# Patient Record
Sex: FEMALE | Race: WHITE | NOT HISPANIC OR LATINO | Employment: FULL TIME | URBAN - METROPOLITAN AREA
[De-identification: names, ages, dates, MRNs, and addresses within clinical notes are randomized per-mention and may not be internally consistent; named-entity substitution may affect disease eponyms.]

---

## 2017-04-17 ENCOUNTER — OFFICE VISIT (OUTPATIENT)
Dept: URGENT CARE | Facility: MEDICAL CENTER | Age: 32
End: 2017-04-17
Payer: COMMERCIAL

## 2017-04-17 DIAGNOSIS — R23.8 OTHER SKIN CHANGES: ICD-10-CM

## 2017-04-17 PROCEDURE — G0382 LEV 3 HOSP TYPE B ED VISIT: HCPCS

## 2017-04-17 PROCEDURE — S9083 URGENT CARE CENTER GLOBAL: HCPCS

## 2017-04-18 ENCOUNTER — LAB REQUISITION (OUTPATIENT)
Dept: LAB | Facility: HOSPITAL | Age: 32
End: 2017-04-18
Payer: COMMERCIAL

## 2017-04-18 DIAGNOSIS — R23.8 OTHER SKIN CHANGES: ICD-10-CM

## 2017-04-18 PROCEDURE — 87077 CULTURE AEROBIC IDENTIFY: CPT | Performed by: PHYSICIAN ASSISTANT

## 2017-04-18 PROCEDURE — 87147 CULTURE TYPE IMMUNOLOGIC: CPT | Performed by: PHYSICIAN ASSISTANT

## 2017-04-18 PROCEDURE — 87070 CULTURE OTHR SPECIMN AEROBIC: CPT | Performed by: PHYSICIAN ASSISTANT

## 2017-04-18 PROCEDURE — 87205 SMEAR GRAM STAIN: CPT | Performed by: PHYSICIAN ASSISTANT

## 2017-04-18 PROCEDURE — 87186 SC STD MICRODIL/AGAR DIL: CPT | Performed by: PHYSICIAN ASSISTANT

## 2017-04-21 LAB
BACTERIA WND AEROBE CULT: NORMAL
GRAM STN SPEC: NORMAL

## 2017-06-13 ENCOUNTER — OFFICE VISIT (OUTPATIENT)
Dept: URGENT CARE | Facility: MEDICAL CENTER | Age: 32
End: 2017-06-13
Payer: COMMERCIAL

## 2017-06-13 PROCEDURE — 99213 OFFICE O/P EST LOW 20 MIN: CPT

## 2018-01-13 NOTE — PROGRESS NOTES
Assessment   1  Wound of skin (782 9) (R23 8)    Plan   Wound of skin    · (1) WOUND CULTURE; Status:Active; Requested for:[id number];     Discussion/Summary   Discussion Summary:    Take bactrim as directed  Keep lesions clean dry and intact  the wound culture sent  Medication Side Effects Reviewed: Possible side effects of new medications were reviewed with the patient/guardian today  Understands and agrees with treatment plan: The treatment plan was reviewed with the patient/guardian  The patient/guardian understands and agrees with the treatment plan    Counseling Documentation With Imm: The patient was counseled regarding diagnostic results,-- instructions for management,-- risk factor reductions,-- prognosis,-- patient and family education,-- impressions,-- risks and benefits of treatment options,-- importance of compliance with treatment  Follow Up Instructions: Follow Up with your Primary Care Provider in 1-2 days  If your symptoms worsen, go to the nearest [place] [address] Emergency Department  Chief Complaint   Chief Complaint Free Text Note Form: rash on buttocks started one week ago, tried many products, painful, no itching, now having constipation,      History of Present Illness   HPI: This is a 29 y/o F c/o rash in the crack of her butt x 7 days  Pt reports there is a white discharge from the rash  Denies any fever/chills, nausea, vomiting, diarrhea, constipation  Pt reports the rash is very painful and made worse after a BM  Pt denies ever having this type of rash in the past     Hospital Based Practices Required Assessment:      Pain Assessment      the patient states they have pain  The pain is located in the buttocks  (on a scale of 0 to 10, the patient rates the pain at 7 )      Abuse And Domestic Violence Screen       Yes, the patient is safe at home  -- The patient states no one is hurting them  Depression And Suicide Screen   No, the patient has not had thoughts of hurting themself  No, the patient has not felt depressed in the past 7 days  Prefered Language is  english  Primary Language is  english  Readiness To Learn: Receptive  Barriers To Learning: none  Preferred Learning: verbal      Review of Systems   Focused-Female:      Constitutional: No fever, no chills, feels well, no tiredness, no recent weight gain or loss  ENT: no ear ache, no loss of hearing, no nosebleeds or nasal discharge, no sore throat or hoarseness  Cardiovascular: no complaints of slow or fast heart rate, no chest pain, no palpitations, no leg claudication or lower extremity edema  Respiratory: no complaints of shortness of breath, no wheezing, no dyspnea on exertion, no orthopnea or PND  Breasts: no complaints of breast pain, breast lump or nipple discharge  Gastrointestinal: no complaints of abdominal pain, no constipation, no nausea or diarrhea, no vomiting, no bloody stools  Genitourinary: no complaints of dysuria, no incontinence, no pelvic pain, no dysmenorrhea, no vaginal discharge or abnormal vaginal bleeding  Musculoskeletal: no complaints of arthralgia, no myalgia, no joint swelling or stiffness, no limb pain or swelling  Integumentary: no complaints of skin rash or lesion, no itching or dry skin, no skin wounds-- and-- as noted in HPI  Neurological: no complaints of headache, no confusion, no numbness or tingling, no dizziness or fainting  Past Medical History   Active Problems And Past Medical History Reviewed: The active problems and past medical history were reviewed and updated today  Family History   Mother    1  Family history of No known health problems  Father    2  Family history of Lymphoma of lymph nodes  Family History Reviewed: The family history was reviewed and updated today  Social History    · Never a smoker  Social History Reviewed: The social history was reviewed and updated today  Surgical History   1  History of Cholecystectomy  Surgical History Reviewed: The surgical history was reviewed and updated today  Current Meds    1  No Reported Medications Recorded  Medication List Reviewed: The medication list was reviewed and updated today  Allergies   1  Cephalosporins    Vitals   Signs   Recorded: [id number] 06:29PM   Temperature: 99 7 F  Heart Rate: 99  Respiration: 24  Systolic: 112  Diastolic: 82  Weight: 876 lb   Pain Scale: 7    Physical Exam        Constitutional      General appearance: No acute distress, well appearing and well nourished  Skin      Skin and subcutaneous tissue: Normal without rashes or lesions  Examination of the skin for lesions: Abnormal  -- Buttock: erythematous ulcer with white discharge bilaterally        Signatures    Electronically signed by : ALEXUS Levi; Jan 12 2018  7:46AM EST                       (Author)     Electronically signed by : LEIGHA Davis ; Jan 12 2018  4:00PM EST                       (Co-author)

## 2018-03-07 ENCOUNTER — OFFICE VISIT (OUTPATIENT)
Dept: URGENT CARE | Facility: MEDICAL CENTER | Age: 33
End: 2018-03-07
Payer: COMMERCIAL

## 2018-03-07 VITALS
SYSTOLIC BLOOD PRESSURE: 120 MMHG | OXYGEN SATURATION: 98 % | RESPIRATION RATE: 20 BRPM | HEIGHT: 64 IN | WEIGHT: 293 LBS | TEMPERATURE: 98.3 F | BODY MASS INDEX: 50.02 KG/M2 | HEART RATE: 70 BPM | DIASTOLIC BLOOD PRESSURE: 80 MMHG

## 2018-03-07 DIAGNOSIS — M72.2 PLANTAR FASCIITIS: Primary | ICD-10-CM

## 2018-03-07 PROCEDURE — 99213 OFFICE O/P EST LOW 20 MIN: CPT | Performed by: FAMILY MEDICINE

## 2018-03-07 PROCEDURE — S9088 SERVICES PROVIDED IN URGENT: HCPCS | Performed by: FAMILY MEDICINE

## 2018-03-07 NOTE — PROGRESS NOTES
Started with left heel pain x7 days ago  Have been soaking heel in [employer]pson salt baths  Pain with weight bearing and certain flexing of foot  Started kick boxing class for past 2 months

## 2018-03-07 NOTE — PROGRESS NOTES
[facility]        NAME: Ricardo Doran is a 33 y o  female  : 1985    MRN: [id number]  DATE: 2018  TIME: 11:22 AM    Assessment and Plan   Plantar fasciitis [M72 2]  1  Plantar fasciitis           Patient Instructions     Likely plantar fasciitis  Ace wrap applied to foot in office  Rest the foot as much as possible  Avoid walking barefoot  Wear supportive shoes with good arch support  Try over the counter arch support inserts  Ice to the area 10-15 minutes 3-4 times daily  Take ibuprofen twice daily for 2 weeks, take with food  Follow up with podiatry for continued symptoms past 2-3 weeks  Follow up with PCP in 3-5 days  Proceed to  ER if symptoms worsen  Chief Complaint     Chief Complaint   Patient presents with    Foot Pain     x 7 days ago         History of Present Illness       This is a 33year old female presenting for left foot pain x 1 week  She states that she recently began kick boxing, went to a kickboxing class and woke up with pain the next morning  No specific moment of injury that she is aware of  The pain is located on her heel, does not travel anywhere else  The pain is made worse with flexion of the foot and weight bearing  No calf pain, forefoot pain, ankle pain  She has not tried anything for the pain at this time  Review of Systems   Review of Systems   Constitutional: Negative  HENT: Negative  Respiratory: Negative  Cardiovascular: Negative  Musculoskeletal: Positive for gait problem  Negative for joint swelling  Skin: Negative for color change and wound  Current Medications     No current outpatient prescriptions on file      Current Allergies     Allergies as of 2018 - Reviewed 2018   Allergen Reaction Noted    Cefaclor Hives     Cephalosporins  2017    Wheat bran  2015            The following portions of the patient's history were reviewed and updated as appropriate: allergies, current medications, past family history, past medical history, past social history, past surgical history and problem list      No past medical history on file  Past Surgical History:   Procedure Laterality Date    CHOLECYSTECTOMY         No family history on file  Medications have been verified  Objective   /80   Pulse 70   Temp 98 3 °F (36 8 °C) (Temporal)   Resp 20   Ht 5' 4" (1 626 m)   Wt 133 kg (293 lb 4 oz)   SpO2 98%   BMI 50 34 kg/m²        Physical Exam     Physical Exam   Constitutional: She appears well-developed and well-nourished  No distress  Musculoskeletal:   Left foot: There is no ecchymosis, erythema, edema, or overlying skin changes  There is mild TTP when the foot is dorsiflexed and palpated at the heel  No TTP over the ankle or metatarsals  FROM of the foot, sensation intact, cap refill less than 3 seconds  Neurological: She is alert  Skin: Skin is warm and dry  She is not diaphoretic

## 2018-03-07 NOTE — PATIENT INSTRUCTIONS
Likely plantar fasciitis  Ace wrap applied to foot in office  Rest the foot as much as possible  Avoid walking barefoot  Wear supportive shoes with good arch support  Try over the counter arch support inserts  Ice to the area 10-15 minutes 3-4 times daily  Take ibuprofen twice daily for 2 weeks, take with food  Follow up with podiatry for continued symptoms past 2-3 weeks

## 2018-05-20 ENCOUNTER — HOSPITAL ENCOUNTER (EMERGENCY)
Facility: HOSPITAL | Age: 33
Discharge: HOME | End: 2018-05-20
Attending: EMERGENCY MEDICINE | Admitting: EMERGENCY MEDICINE
Payer: COMMERCIAL

## 2018-05-20 ENCOUNTER — APPOINTMENT (EMERGENCY)
Dept: RADIOLOGY | Facility: HOSPITAL | Age: 33
End: 2018-05-20
Payer: COMMERCIAL

## 2018-05-20 VITALS
RESPIRATION RATE: 22 BRPM | HEART RATE: 93 BPM | OXYGEN SATURATION: 95 % | BODY MASS INDEX: 52.08 KG/M2 | WEIGHT: 293 LBS | TEMPERATURE: 97.2 F | SYSTOLIC BLOOD PRESSURE: 180 MMHG | DIASTOLIC BLOOD PRESSURE: 86 MMHG

## 2018-05-20 DIAGNOSIS — S41.152A DOG BITE OF LEFT UPPER EXTREMITY, INITIAL ENCOUNTER: Primary | ICD-10-CM
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
## 2018-05-20 PROCEDURE — 90471 IMMUNIZATION ADMIN: CPT

## 2018-05-20 PROCEDURE — 90715 TDAP VACCINE 7 YRS/> IM: CPT | Performed by: PHYSICIAN ASSISTANT

## 2018-05-20 PROCEDURE — 73090 X-RAY EXAM OF FOREARM: CPT

## 2018-05-20 PROCEDURE — 99283 EMERGENCY DEPT VISIT LOW MDM: CPT

## 2018-05-20 RX ORDER — AMOXICILLIN AND CLAVULANATE POTASSIUM 875; 125 MG/1; MG/1
1 TABLET, FILM COATED ORAL 2 TIMES DAILY
Qty: 20 TABLET | Refills: 0 | Status: SHIPPED | OUTPATIENT
Start: 2018-05-20 | End: 2018-05-22 | Stop reason: ALTCHOICE

## 2018-05-20 RX ORDER — OXYCODONE HYDROCHLORIDE 5 MG/1
5 TABLET ORAL EVERY 6 HOURS PRN
Qty: 16 TABLET | Refills: 0 | Status: SHIPPED | OUTPATIENT
Start: 2018-05-20 | End: 2018-05-22 | Stop reason: ALTCHOICE

## 2018-05-20 RX ORDER — ONDANSETRON 4 MG/1
4 TABLET, ORALLY DISINTEGRATING ORAL ONCE
Status: COMPLETED | OUTPATIENT
Start: 2018-05-20 | End: 2018-05-20

## 2018-05-20 RX ORDER — LIDOCAINE HYDROCHLORIDE AND EPINEPHRINE 10; 10 MG/ML; UG/ML
20 INJECTION, SOLUTION INFILTRATION; PERINEURAL ONCE
Status: COMPLETED | OUTPATIENT
Start: 2018-05-20 | End: 2018-05-20

## 2018-05-20 RX ORDER — OXYCODONE HYDROCHLORIDE 5 MG/1
5 TABLET ORAL ONCE
Status: COMPLETED | OUTPATIENT
Start: 2018-05-20 | End: 2018-05-20

## 2018-05-20 RX ADMIN — LIDOCAINE HYDROCHLORIDE,EPINEPHRINE BITARTRATE 20 ML: 10; .01 INJECTION, SOLUTION INFILTRATION; PERINEURAL at 18:15

## 2018-05-20 RX ADMIN — ONDANSETRON 4 MG: 4 TABLET, ORALLY DISINTEGRATING ORAL at 18:31

## 2018-05-20 RX ADMIN — TETANUS TOXOID, REDUCED DIPHTHERIA TOXOID AND ACELLULAR PERTUSSIS VACCINE, ADSORBED 0.5 ML: 5; 2.5; 8; 8; 2.5 SUSPENSION INTRAMUSCULAR at 18:32

## 2018-05-20 RX ADMIN — OXYCODONE HYDROCHLORIDE 5 MG: 5 TABLET ORAL at 18:31

## 2018-05-20 NOTE — DISCHARGE INSTRUCTIONS
Animal Bite   WHAT YOU NEED TO KNOW:   Animal bite injuries range from shallow cuts to deep, life-threatening wounds  An animal can cut or puncture the skin when it bites  Your skin may be torn from your body  Your skin may swell or bruise even if the bite does not break the skin  Animal bites occur more often on the hands, arms, legs, and face  Bites from dogs and cats are the most common injuries  DISCHARGE INSTRUCTIONS:   Return to the emergency department if:   · You have a fever  · Your wound is red, swollen, and draining pus  · You see red streaks on the skin around the wound  · You can no longer move the bitten area  · Your heartbeat and breathing are much faster than usual     · You feel dizzy and confused  Contact your healthcare provider if:   · Your pain does not get better, even after you take pain medicine  · You have nightmares or flashbacks about the animal bite  · You have questions or concerns about your condition or care  Medicines: You may need any of the following:  · Antibiotics  prevent or treat a bacterial infection  · Prescription pain medicine  may be given  Ask how to take this medicine safely  · A tetanus vaccine  may be needed to prevent tetanus  Tetanus is a life-threatening bacterial infection that affects the nerves and muscles  The bacteria can be spread through animal bites  · A rabies vaccine  may be needed to prevent rabies  Rabies is a life-threatening viral infection  The virus can be spread through animal bites  · Take your medicine as directed  Contact your healthcare provider if you think your medicine is not helping or if you have side effects  Tell him of her if you are allergic to any medicine  Keep a list of the medicines, vitamins, and herbs you take  Include the amounts, and when and why you take them  Bring the list or the pill bottles to follow-up visits  Carry your medicine list with you in case of an emergency    Follow up with your healthcare provider in 1 to 2 days: You may need to return to have your stitches removed  Write down your questions so you remember to ask them during your visits  Self-care:   · Apply antibiotic ointment as directed  This helps prevent infection in minor skin wounds  It is available without a doctor's order  · Keep the wound clean and covered  Wash the wound every day with soap and water or germ-killing cleanser  Ask your healthcare provider about the kinds of bandages to use  · Apply ice on your wound  Ice helps decrease swelling and pain  Ice may also help prevent tissue damage  Use an ice pack, or put crushed ice in a plastic bag  Cover it with a towel and place it on your wound for 15 to 20 minutes every hour or as directed  · Elevate the wound area  Raise your wound above the level of your heart as often as you can  This will help decrease swelling and pain  Prop your wound on pillows or blankets to keep it elevated comfortably  Prevent another animal bite:   · Learn to recognize the signs of a scared or angry pet  Avoid quick, sudden movements  · Do not step between animals that are fighting  · Do not leave a pet alone with a young child  · Do not disturb an animal while it eats, sleeps, or cares for its young  · Do not approach an animal you do not know, especially one that is tied up or caged  · Stay away from animals that seem sick or act strangely  · Do not feed or capture wild animals  © 2017 [employer] Information is for End User's use only and may not be sold, redistributed or otherwise used for commercial purposes  All illustrations and images included in CareNotes® are the copyrighted property of [employer]  or Hugh Cordon  The above information is an  only  It is not intended as medical advice for individual conditions or treatments   Talk to your doctor, nurse or pharmacist before following any medical regimen to see if it is safe and effective for you  Laceration   WHAT YOU NEED TO KNOW:   A laceration is an injury to the skin and the soft tissue underneath it  Lacerations happen when you are cut or hit by something  They can happen anywhere on the body  DISCHARGE INSTRUCTIONS:   Return to the emergency department if:   · You have heavy bleeding or bleeding that does not stop after 10 minutes of holding firm, direct pressure over the wound  · Your wound opens up  Contact your healthcare provider if:   · You have a fever or chills  · Your laceration is red, warm, or swollen  · You have red streaks on your skin coming from your wound  · You have white or yellow drainage from the wound that smells bad  · You have pain that gets worse, even after treatment  · You have questions or concerns about your condition or care  Medicines:   · Prescription pain medicine  may be given  Ask how to take this medicine safely  · Antibiotics  help treat or prevent a bacterial infection  · Take your medicine as directed  Contact your healthcare provider if you think your medicine is not helping or if you have side effects  Tell him or her if you are allergic to any medicine  Keep a list of the medicines, vitamins, and herbs you take  Include the amounts, and when and why you take them  Bring the list or the pill bottles to follow-up visits  Carry your medicine list with you in case of an emergency  Care for your wound as directed:   · Do not get your wound wet  until your healthcare provider says it is okay  Do not soak your wound in water  Do not go swimming until your healthcare provider says it is okay  Carefully wash the wound with soap and water  Gently pat the area dry or allow it to air dry  · Change your bandages  when they get wet, dirty, or after washing  Apply new, clean bandages as directed  Do not apply elastic bandages or tape too tight   Do not put powders or lotions over your incision  · Apply antibiotic ointment as directed  Your healthcare provider may give you antibiotic ointment to put over your wound if you have stitches  If you have strips of tape over your incision, let them dry up and fall off on their own  If they do not fall off within 14 days, gently remove them  If you have glue over your wound, do not remove or pick at it  If your glue comes off, do not replace it with glue that you have at home  · Check your wound every day for signs of infection such as swelling, redness, or pus  Self-care:   · Apply ice  on your wound for 15 to 20 minutes every hour or as directed  Use an ice pack, or put crushed ice in a plastic bag  Cover it with a towel  Ice helps prevent tissue damage and decreases swelling and pain  · Use a splint as directed  A splint will decrease movement and stress on your wound  It may help it heal faster  A splint may be used for lacerations over joints or areas of your body that bend  Ask your healthcare provider how to apply and remove a splint  · Decrease scarring of your wound  by applying ointments as directed  Do not apply ointments until your healthcare provider says it is okay  You may need to wait until your wound is healed  Ask which ointment to buy and how often to use it  After your wound is healed, use sunscreen over the area when you are out in the sun  You should do this for at least 6 months to 1 year after your injury  Follow up with your healthcare provider as directed: You may need to follow up in 24 to 48 hours to have your wound checked for infection  You will need to return in 3 to 14 days if you have stitches or staples so they can be removed  Care for your wound as directed to prevent infection and help it heal  Write down your questions so you remember to ask them during your visits    © 2017 [employer] Information is for End User's use only and may not be sold, redistributed or otherwise used for commercial purposes  All illustrations and images included in CareNotes® are the copyrighted property of [employer]  or Hugh Cordon  The above information is an  only  It is not intended as medical advice for individual conditions or treatments  Talk to your doctor, nurse or pharmacist before following any medical regimen to see if it is safe and effective for you

## 2018-05-20 NOTE — ED PROVIDER NOTES
History  Chief Complaint   Patient presents with    Dog Bite     Pt  presents to the ED with complaints of a dog bite that occurred from ehr own dog today  Pt  reports injury to the left arm  Pt  unsure of her last tetanus  Pt  reprorts that her dog last had a rabies vaccination 4 years ago  71-year-old female presents to the emergency department with complaints of left-sided arm pain after a dog bite  States that she was drying off her parents dog after had been outside and it suddenly turned and bit her on the upper arm and forearm  States she has pain mostly in the forearm with tingling in the fingers  Still able to move her wrist and fingers fully  Unsure of her last tetanus shot  Dog was previously vaccinated  Patient is right-handed  History provided by:  Patient   used: No        None       History reviewed  No pertinent past medical history  Past Surgical History:   Procedure Laterality Date    CHOLECYSTECTOMY         History reviewed  No pertinent family history  I have reviewed and agree with the history as documented  Social History   Substance Use Topics    Smoking status: Never Smoker    Smokeless tobacco: Never Used    Alcohol use No        Review of Systems   Constitutional: Negative for chills and fever  HENT: Negative for congestion, dental problem and sore throat  Respiratory: Negative for cough  Cardiovascular: Negative for chest pain  Gastrointestinal: Negative for abdominal pain  Musculoskeletal: Negative for back pain and neck pain  Arm pain   Skin: Positive for wound  Negative for rash  All other systems reviewed and are negative  Physical Exam  Physical Exam   Constitutional: She is oriented to person, place, and time  Vital signs are normal  She appears well-developed and well-nourished  HENT:   Head: Normocephalic and atraumatic  Cardiovascular: Normal rate and regular rhythm      Pulmonary/Chest: Effort normal and breath sounds normal  No respiratory distress  She has no wheezes  She has no rhonchi  She has no rales  Musculoskeletal:        Left forearm: She exhibits tenderness, swelling and laceration (5 separate 1 cm lacerations to the volar aspect of the left forearm     Small amount of venous oozing  No distal sensory or circulatory deficits  )  She exhibits no bony tenderness, no edema and no deformity  Arms:  Neurological: She is alert and oriented to person, place, and time  Skin: Skin is warm and dry  Psychiatric: She has a normal mood and affect  Her behavior is normal    Nursing note and vitals reviewed  Vital Signs  ED Triage Vitals [05/20/18 1733]   Temperature Pulse Respirations Blood Pressure SpO2   (!) 97 2 °F (36 2 °C) 93 22 (!) 180/86 95 %      Temp Source Heart Rate Source Patient Position - Orthostatic VS BP Location FiO2 (%)   Oral Monitor Lying Right arm --      Pain Score       8           Vitals:    05/20/18 1733   BP: (!) 180/86   Pulse: 93   Patient Position - Orthostatic VS: Lying       Visual Acuity      ED Medications  Medications   oxyCODONE (ROXICODONE) IR tablet 5 mg (5 mg Oral Given 5/20/18 1831)   ondansetron (ZOFRAN-ODT) dispersible tablet 4 mg (4 mg Oral Given 5/20/18 1831)   tetanus-diphtheria-acellular pertussis (BOOSTRIX) IM injection 0 5 mL (0 5 mL Intramuscular Given 5/20/18 1832)   lidocaine-epinephrine (XYLOCAINE/EPINEPHRINE) 1 %-1:100,000 injection 20 mL (20 mL Infiltration Given 5/20/18 1815)       Diagnostic Studies  Results Reviewed     None                 XR forearm 2 views LEFT   ED Interpretation by Amish Cardenas PA-C (05/20 1848)   No fracture or FB  Final Result by Brandon Acuña DO (05/21 2249)      No acute osseous abnormality  Soft tissue injury distal forearm in keeping with history of laceration without evidence of radiopaque foreign bodies        As per comments in EPIC, findings are concordant with preliminary interpretation provided by the emergency department  Workstation performed: [id number]                    Procedures  Lac Repair  Date/Time: 5/20/2018 6:49 PM  Performed by: Chacha Noble  Authorized by: Domenica Ann   Consent: Verbal consent obtained  Consent given by: patient  Patient understanding: patient states understanding of the procedure being performed  Patient consent: the patient's understanding of the procedure matches consent given  Imaging studies: imaging studies available  Patient identity confirmed: verbally with patient  Body area: upper extremity  Location details: left lower arm  Wound length (cm): 5, 1 cm lacerations  Foreign bodies: no foreign bodies  Tendon involvement: none  Vascular damage: no  Anesthesia: local infiltration    Anesthesia:  Local Anesthetic: lidocaine 1% with epinephrine  Anesthetic total: 18 mL    Sedation:  Patient sedated: no    Wound Dehiscence:  Superficial Wound Dehiscence: simple closure      Procedure Details:  Irrigation solution: saline  Irrigation method: tap  Amount of cleaning: standard  Debridement: none  Degree of undermining: none  Skin closure: 4-0 nylon  Number of sutures: 5  Technique: horizontal mattress  Approximation: loose  Approximation difficulty: simple  Dressing: non-adhesive packing strip and tube gauze  Patient tolerance: Patient tolerated the procedure well with no immediate complications  Comments: 1 horizontal mattress suture placed in each of the 5 1 cm wounds  Phone Contacts  ED Phone Contact    ED Course                               MDM  Number of Diagnoses or Management Options  Dog bite of left upper extremity, initial encounter:   Diagnosis management comments: Differential diagnosis includes but not limited to:  A dog bite, retained foreign body, fracture         Amount and/or Complexity of Data Reviewed  Tests in the radiology section of CPT®: ordered and reviewed  Independent visualization of images, tracings, or specimens: yes      CritCare Time    Disposition  Final diagnoses:   Dog bite of left upper extremity, initial encounter     Time reflects when diagnosis was documented in both MDM as applicable and the Disposition within this note     Time User Action Codes Description Comment    5/20/2018  6:47 PM [address], [address],  [place]  0XXA] Dog bite of left upper extremity, initial encounter       ED Disposition     ED Disposition Condition Comment    Discharge  Ramandeep Estrada discharge to home/self care  Condition at discharge: Stable        Follow-up Information     Follow up With Specialties Details Why Contact Info Additional Information    [facility] Emergency Medicine  For suture removal [address] [facility]  AN ED,  [address], [place], [place], [address]          Discharge Medication List as of 5/20/2018  6:52 PM      START taking these medications    Details   amoxicillin-clavulanate (AUGMENTIN) 875-125 mg per tablet Take 1 tablet by mouth 2 (two) times a day for 10 days, Starting Sun 5/20/2018, Until Wed 5/30/2018, Print      oxyCODONE (ROXICODONE) 5 mg immediate release tablet Take 1 tablet (5 mg total) by mouth every 6 (six) hours as needed for moderate pain for up to 4 days Max Daily Amount: 20 mg, Starting Sun 5/20/2018, Until Thu 5/24/2018, Print           No discharge procedures on file      ED Provider  Electronically Signed by           William Hooper PA-C  05/21/18 9139

## 2018-05-22 ENCOUNTER — HOSPITAL ENCOUNTER (INPATIENT)
Facility: HOSPITAL | Age: 33
LOS: 3 days | Discharge: HOME | DRG: 603 | End: 2018-05-25
Attending: EMERGENCY MEDICINE | Admitting: INTERNAL MEDICINE
Payer: COMMERCIAL

## 2018-05-22 DIAGNOSIS — S51.859A: Primary | ICD-10-CM

## 2018-05-22 DIAGNOSIS — L08.9: Primary | ICD-10-CM
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
## 2018-05-22 DIAGNOSIS — S51.852A: ICD-10-CM
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
## 2018-05-22 DIAGNOSIS — L08.9: ICD-10-CM

## 2018-05-22 LAB
ANION GAP SERPL CALCULATED.3IONS-SCNC: 12 MMOL/L (ref 4–13)
BASOPHILS # BLD AUTO: 0.03 THOUSANDS/ΜL (ref 0–0.1)
BASOPHILS NFR BLD AUTO: 0 % (ref 0–1)
BUN SERPL-MCNC: 13 MG/DL (ref 5–25)
CALCIUM SERPL-MCNC: 9 MG/DL (ref 8.3–10.1)
CHLORIDE SERPL-SCNC: 101 MMOL/L (ref 100–108)
CO2 SERPL-SCNC: 26 MMOL/L (ref 21–32)
CREAT SERPL-MCNC: 0.91 MG/DL (ref 0.6–1.3)
EOSINOPHIL # BLD AUTO: 0.08 THOUSAND/ΜL (ref 0–0.61)
EOSINOPHIL NFR BLD AUTO: 1 % (ref 0–6)
ERYTHROCYTE [DISTWIDTH] IN BLOOD BY AUTOMATED COUNT: 12.8 % (ref 11.6–15.1)
EXT PREG TEST URINE: NEGATIVE
GFR SERPL CREATININE-BSD FRML MDRD: 84 ML/MIN/1.73SQ M
GLUCOSE SERPL-MCNC: 88 MG/DL (ref 65–140)
HCT VFR BLD AUTO: 37.9 % (ref 34.8–46.1)
HGB BLD-MCNC: 12.4 G/DL (ref 11.5–15.4)
LACTATE SERPL-SCNC: 0.8 MMOL/L (ref 0.5–2)
LYMPHOCYTES # BLD AUTO: 1.67 THOUSANDS/ΜL (ref 0.6–4.47)
LYMPHOCYTES NFR BLD AUTO: 19 % (ref 14–44)
MCH RBC QN AUTO: 29.5 PG (ref 26.8–34.3)
MCHC RBC AUTO-ENTMCNC: 32.7 G/DL (ref 31.4–37.4)
MCV RBC AUTO: 90 FL (ref 82–98)
MONOCYTES # BLD AUTO: 0.64 THOUSAND/ΜL (ref 0.17–1.22)
MONOCYTES NFR BLD AUTO: 7 % (ref 4–12)
NEUTROPHILS # BLD AUTO: 6.37 THOUSANDS/ΜL (ref 1.85–7.62)
NEUTS SEG NFR BLD AUTO: 73 % (ref 43–75)
PLATELET # BLD AUTO: 266 THOUSANDS/UL (ref 149–390)
PMV BLD AUTO: 10.1 FL (ref 8.9–12.7)
POTASSIUM SERPL-SCNC: 3.5 MMOL/L (ref 3.5–5.3)
RBC # BLD AUTO: 4.21 MILLION/UL (ref 3.81–5.12)
SODIUM SERPL-SCNC: 139 MMOL/L (ref 136–145)
WBC # BLD AUTO: 8.79 THOUSAND/UL (ref 4.31–10.16)

## 2018-05-22 PROCEDURE — 99284 EMERGENCY DEPT VISIT MOD MDM: CPT

## 2018-05-22 PROCEDURE — 80048 BASIC METABOLIC PNL TOTAL CA: CPT | Performed by: EMERGENCY MEDICINE

## 2018-05-22 PROCEDURE — 36415 COLL VENOUS BLD VENIPUNCTURE: CPT | Performed by: EMERGENCY MEDICINE

## 2018-05-22 PROCEDURE — 83605 ASSAY OF LACTIC ACID: CPT | Performed by: EMERGENCY MEDICINE

## 2018-05-22 PROCEDURE — 99223 1ST HOSP IP/OBS HIGH 75: CPT | Performed by: INTERNAL MEDICINE

## 2018-05-22 PROCEDURE — 85025 COMPLETE CBC W/AUTO DIFF WBC: CPT | Performed by: EMERGENCY MEDICINE

## 2018-05-22 PROCEDURE — 81025 URINE PREGNANCY TEST: CPT | Performed by: EMERGENCY MEDICINE

## 2018-05-22 PROCEDURE — 87040 BLOOD CULTURE FOR BACTERIA: CPT | Performed by: EMERGENCY MEDICINE

## 2018-05-22 RX ORDER — ACETAMINOPHEN 325 MG/1
650 TABLET ORAL EVERY 4 HOURS PRN
Status: DISCONTINUED | OUTPATIENT
Start: 2018-05-22 | End: 2018-05-25 | Stop reason: HOSPADM

## 2018-05-22 RX ORDER — AMPICILLIN AND SULBACTAM 2; 1 G/1; G/1
3 INJECTION, POWDER, FOR SOLUTION INTRAMUSCULAR; INTRAVENOUS ONCE
Status: DISCONTINUED | OUTPATIENT
Start: 2018-05-22 | End: 2018-05-22 | Stop reason: SDUPTHER

## 2018-05-22 RX ORDER — OXYCODONE HYDROCHLORIDE 5 MG/1
2.5 TABLET ORAL EVERY 4 HOURS PRN
Status: DISCONTINUED | OUTPATIENT
Start: 2018-05-22 | End: 2018-05-25 | Stop reason: HOSPADM

## 2018-05-22 RX ORDER — KETOROLAC TROMETHAMINE 30 MG/ML
15 INJECTION, SOLUTION INTRAMUSCULAR; INTRAVENOUS ONCE
Status: COMPLETED | OUTPATIENT
Start: 2018-05-22 | End: 2018-05-22

## 2018-05-22 RX ORDER — KETOROLAC TROMETHAMINE 30 MG/ML
15 INJECTION, SOLUTION INTRAMUSCULAR; INTRAVENOUS EVERY 6 HOURS PRN
Status: DISPENSED | OUTPATIENT
Start: 2018-05-22 | End: 2018-05-24

## 2018-05-22 RX ADMIN — OXYCODONE HYDROCHLORIDE 2.5 MG: 5 TABLET ORAL at 19:07

## 2018-05-22 RX ADMIN — SODIUM CHLORIDE 1000 ML: 0.9 INJECTION, SOLUTION INTRAVENOUS at 16:30

## 2018-05-22 RX ADMIN — KETOROLAC TROMETHAMINE 15 MG: 30 INJECTION, SOLUTION INTRAMUSCULAR at 17:03

## 2018-05-22 RX ADMIN — SODIUM CHLORIDE 3 G: 9 INJECTION, SOLUTION INTRAVENOUS at 17:12

## 2018-05-22 RX ADMIN — KETOROLAC TROMETHAMINE 15 MG: 30 INJECTION, SOLUTION INTRAMUSCULAR at 20:05

## 2018-05-22 NOTE — H&P
H&P- Raheem Pineda 1985, 33 y o  female MRN: [id number]    Unit/Bed#: -01 Encounter: [id number]    Primary Care Provider: No primary care provider on file  Date and time admitted to hospital: 5/22/2018  3:29 PM      Dog bite of left forearm with infection   Assessment & Plan    · Seen 2 days ago at the ED status post dog bite  Wounds were sutured and she was discharged on oral Augmentin which she took the following day  · Now with worsening redness, swelling with evidence of ascending lymphangitis  · Continue IV antibiotics with Unasyn, elevate extremity  · If swelling, redness does not improve in the next 24 hrs, will obtain ID/hand surgery evaluation  VTE Prophylaxis:  Low risk, ambulate    Code Status:  Full code    POLST: POLST form is not discussed and not completed at this time  Anticipated Length of Stay:  Patient will be admitted on an Inpatient basis with an anticipated length of stay of  > 2 midnights  Justification for Hospital Stay:  Left upper extremity cellulitis/infected dog bite    Chief Complaint:     Pain, redness, swelling right upper extremity    History of Present Illness:  Raheem Pineda is a 33 y o  female who initially presented to the ED on Sunday 2/20/18 after being bitten by her dog  At that time, wounds which involved left forearm was sutured and she also sustained bruising and bite to left shoulder as well  She was prescribed p o  Augmentin which she took yesterday  Due to worsening pain and redness however, she re-presented today having failed outpatient management  Patient has also noticed increasing warmth however no discharge from multiple abrasions and sutured wounds  She had a low-grade temp of 100 3° at the ED today  She denies any other symptoms    Review of Systems   Constitutional: Positive for chills and fever  HENT: Negative  Eyes: Negative  Respiratory: Negative  Cardiovascular: Negative  Gastrointestinal: Negative  Genitourinary: Negative  Musculoskeletal:        Left upper extremity pain, swelling, tenderness and redness   Skin: Positive for color change and wound  Neurological: Negative  Psychiatric/Behavioral: Negative  All other systems reviewed and are negative  History reviewed  No pertinent past medical history  Past Surgical History:   Procedure Laterality Date    CHOLECYSTECTOMY         Family History:  non-contributory    Home Meds:  None    Allergies: Allergies   Allergen Reactions    Cefaclor Hives     Other reaction(s): hives, joint pain    Cephalosporins     Wheat Bran          Marital Status: Single     History   Alcohol Use No     History   Smoking Status    Never Smoker   Smokeless Tobacco    Never Used     History   Drug Use No           Physical Exam:   Vitals:   Blood Pressure: 144/89 (05/22/18 1823)  Pulse: 86 (05/22/18 1823)  Temperature: 97 6 °F (36 4 °C) (05/22/18 1823)  Temp Source: Oral (05/22/18 1823)  Respirations: 18 (05/22/18 1823)  Height: 5' 4" (162 6 cm) (05/22/18 1823)  SpO2: 100 % (05/22/18 1823)    Physical Exam   Constitutional: She is oriented to person, place, and time  She appears well-developed and well-nourished  No distress  HENT:   Head: Normocephalic and atraumatic  Eyes: Conjunctivae and EOM are normal  Right eye exhibits no discharge  Left eye exhibits no discharge  No scleral icterus  Neck: Normal range of motion  Neck supple  No JVD present  Cardiovascular: Normal rate, regular rhythm and normal heart sounds  No murmur heard  Pulmonary/Chest: Effort normal and breath sounds normal  No respiratory distress  She has no wheezes  She has no rales  Abdominal: Soft  Bowel sounds are normal  She exhibits no distension and no mass  There is no tenderness  Obese   Musculoskeletal: She exhibits edema and tenderness  Left shoulder: She exhibits tenderness, swelling and pain  She exhibits no crepitus          Left wrist: She exhibits decreased range of motion, tenderness, swelling and laceration  She exhibits no crepitus  Edema, erythema involving left forearm with multiple sutured lacerations  Multiple bruising over left elbow  Intact radial pulse   Neurological: She is alert and oriented to person, place, and time  No cranial nerve deficit  Skin: Skin is warm and dry  She is not diaphoretic  There is erythema  Psychiatric: She has a normal mood and affect  Her behavior is normal    Vitals reviewed  Lab Results: I have personally reviewed pertinent reports  Results from last 7 days  Lab Units 05/22/18  1558   WBC Thousand/uL 8 79   HEMOGLOBIN g/dL 12 4   HEMATOCRIT % 37 9   PLATELETS Thousands/uL 266   NEUTROS PCT % 73   LYMPHS PCT % 19   MONOS PCT % 7   EOS PCT % 1       Results from last 7 days  Lab Units 05/22/18  1558   SODIUM mmol/L 139   POTASSIUM mmol/L 3 5   CHLORIDE mmol/L 101   CO2 mmol/L 26   BUN mg/dL 13   CREATININE mg/dL 0 91   CALCIUM mg/dL 9 0   GLUCOSE RANDOM mg/dL 88           Imaging: I have personally reviewed pertinent reports  Xr Forearm 2 Views Left    Result Date: 5/21/2018  Narrative: LEFT FOREARM INDICATION:   Lacerations anteromedial left forearm after sustaining dog bite  COMPARISON:  None VIEWS:  XR FOREARM 2 VW LEFT FINDINGS: There is no acute fracture or dislocation  No degenerative changes  No lytic or blastic lesions are seen  Soft tissue swelling and subcutaneous gas seen along the distal forearm more so ventrally in keeping with history of dog bite  No discernible radiopaque foreign bodies  Impression: No acute osseous abnormality  Soft tissue injury distal forearm in keeping with history of laceration without evidence of radiopaque foreign bodies  As per comments in EPIC, findings are concordant with preliminary interpretation provided by the emergency department   Workstation performed: [id number]     ** Please Note: Dragon 360 Dictation voice to text software may have been used in the creation of this document   **

## 2018-05-22 NOTE — ED PROVIDER NOTES
History  Chief Complaint   Patient presents with    Dog Bite     Seen in ED sunday evening after being bit by dog, was told to return if redness worsened  Pt states it is now moving up her arm  41-year-old female presents to the emergency department for evaluation of worsening left upper extremity pain  Patient was treated for dog bite wounds on Sunday 2 days ago  She was bit by her family derm and [employer]  Patient has multiple sutured lacerations along the left forearm as well as contusions and abrasions to the left shoulder  Patient has noticed over the past 24 hours increased redness tenderness and warmth of the wounds of the left wrist   She has noticed redness does tracking towards the elbow  She reports decreased range of motion at the wrist secondary to pain and swelling  She denies numbness or weakness of the hand  She has been taking Augmentin and ibuprofen, oxycodone for pain  Today she felt feverish and presents the temperature 100 3°  Tetanus was updated 2 days ago        History provided by:  Patient and medical records   used: No    Wound Check    She was treated in the ED 2 to 3 days ago  Previous treatment in the ED includes laceration repair, wound cleansing or irrigation and oral antibiotics  Treatments since wound repair include oral antibiotics and regular soap and water washings  The fever has been present for less than 1 day  The maximum temperature noted was 100 4 to 100 9 F  The temperature was taken using an oral thermometer  There has been colored discharge from the wound  There is new redness present  The swelling has worsened  The pain has worsened  There is difficulty moving the extremity or digit due to pain  None       History reviewed  No pertinent past medical history  Past Surgical History:   Procedure Laterality Date    CHOLECYSTECTOMY         History reviewed  No pertinent family history    I have reviewed and agree with the history as documented  Social History   Substance Use Topics    Smoking status: Never Smoker    Smokeless tobacco: Never Used    Alcohol use No        Review of Systems   Constitutional: Positive for chills and fever  Cardiovascular: Negative for chest pain  Gastrointestinal: Negative for nausea and vomiting  Genitourinary: Negative for dysuria and flank pain  Musculoskeletal: Positive for arthralgias  Skin: Positive for rash and wound  Neurological: Negative for dizziness, weakness and numbness  Psychiatric/Behavioral: Negative for confusion  The patient is not nervous/anxious  All other systems reviewed and are negative  Physical Exam  Physical Exam   Constitutional: She is oriented to person, place, and time  She appears well-developed and well-nourished  HENT:   Head: Normocephalic  Nose: Nose normal    Mouth/Throat: Oropharynx is clear and moist  No oropharyngeal exudate  Eyes: Conjunctivae and EOM are normal  Pupils are equal, round, and reactive to light  Neck: Normal range of motion  Neck supple  Cardiovascular: Normal rate, regular rhythm, normal heart sounds and intact distal pulses  Pulmonary/Chest: Effort normal and breath sounds normal    Abdominal: Soft  Bowel sounds are normal  She exhibits no distension  There is no tenderness  There is no rebound and no guarding  Musculoskeletal: Normal range of motion  She exhibits no edema, tenderness or deformity  Lymphadenopathy:     She has no cervical adenopathy  Neurological: She is alert and oriented to person, place, and time  She has normal strength and normal reflexes  No cranial nerve deficit or sensory deficit  She exhibits normal muscle tone  Coordination and gait normal    Skin: Skin is warm and dry  Capillary refill takes less than 2 seconds  Abrasion, bruising and ecchymosis noted  No rash noted  There is erythema  Psychiatric: She has a normal mood and affect   Her behavior is normal  Judgment and thought content normal    Nursing note and vitals reviewed  Vital Signs  ED Triage Vitals   Temperature Pulse Respirations Blood Pressure SpO2   05/22/18 1456 05/22/18 1456 05/22/18 1456 05/22/18 1456 05/22/18 1456   100 3 °F (37 9 °C) 88 18 146/88 100 %      Temp src Heart Rate Source Patient Position - Orthostatic VS BP Location FiO2 (%)   -- 05/22/18 1456 05/22/18 1456 05/22/18 1456 --    Monitor Lying Left arm       Pain Score       05/22/18 1703       7           Vitals:    05/22/18 1456 05/22/18 1721   BP: 146/88 154/83   Pulse: 88 83   Patient Position - Orthostatic VS: Lying Lying       Visual Acuity      ED Medications  Medications   sodium chloride 0 9 % bolus 1,000 mL (1,000 mL Intravenous New Bag 5/22/18 1630)   ketorolac (TORADOL) injection 15 mg (15 mg Intravenous Given 5/22/18 1703)   ampicillin-sulbactam (UNASYN) 3 g in sodium chloride 0 9 % 100 mL IVPB (0 g Intravenous Stopped 5/22/18 1746)       Diagnostic Studies  Results Reviewed     Procedure Component Value Units Date/Time    Lactic acid, plasma [[id number]]  (Normal) Collected:  05/22/18 1702    Lab Status:  Final result Specimen:  Blood from Arm, Right Updated:  05/22/18 1730     LACTIC ACID 0 8 mmol/L     Narrative:         Result may be elevated if tourniquet was used during collection  Blood culture #1 [[id number]] Collected:  05/22/18 1702    Lab Status:   In process Specimen:  Blood from Arm, Right Updated:  05/22/18 1709    POCT pregnancy, urine [[id number]]  (Normal) Resulted:  05/22/18 1701    Lab Status:  Final result Updated:  05/22/18 1701     EXT PREG TEST UR (Ref: Negative) negative    Basic metabolic panel [[id number]] Collected:  05/22/18 1558    Lab Status:  Final result Specimen:  Blood from Arm, Right Updated:  05/22/18 1633     Sodium 139 mmol/L      Potassium 3 5 mmol/L      Chloride 101 mmol/L      CO2 26 mmol/L      Anion Gap 12 mmol/L      BUN 13 mg/dL      Creatinine 0 91 mg/dL      Glucose 88 mg/dL      Calcium 9 0 mg/dL      eGFR 84 ml/min/1 73sq m     Narrative:         National Kidney Disease Education Program recommendations are as follows:  GFR calculation is accurate only with a steady state creatinine  Chronic Kidney disease less than 60 ml/min/1 73 sq  meters  Kidney failure less than 15 ml/min/1 73 sq  meters  CBC and differential [[id number]]  (Normal) Collected:  05/22/18 1558    Lab Status:  Final result Specimen:  Blood from Arm, Right Updated:  05/22/18 1610     WBC 8 79 Thousand/uL      RBC 4 21 Million/uL      Hemoglobin 12 4 g/dL      Hematocrit 37 9 %      MCV 90 fL      MCH 29 5 pg      MCHC 32 7 g/dL      RDW 12 8 %      MPV 10 1 fL      Platelets 994 Thousands/uL      Neutrophils Relative 73 %      Lymphocytes Relative 19 %      Monocytes Relative 7 %      Eosinophils Relative 1 %      Basophils Relative 0 %      Neutrophils Absolute 6 37 Thousands/µL      Lymphocytes Absolute 1 67 Thousands/µL      Monocytes Absolute 0 64 Thousand/µL      Eosinophils Absolute 0 08 Thousand/µL      Basophils Absolute 0 03 Thousands/µL     Blood culture #2 [[id number]] Collected:  05/22/18 5649    Lab Status:   In process Specimen:  Blood from Arm, Right Updated:  05/22/18 1606                 No orders to display              Procedures  Procedures       Phone Contacts  ED Phone Contact    ED Course                               MDM  Number of Diagnoses or Management Options  Infected dog bite of forearm: new and requires workup     Amount and/or Complexity of Data Reviewed  Clinical lab tests: ordered and reviewed  Tests in the radiology section of CPT®: reviewed  Decide to obtain previous medical records or to obtain history from someone other than the patient: yes  Discuss the patient with other providers: yes  Independent visualization of images, tracings, or specimens: yes    Patient Progress  Patient progress: stable    CritCare Time    Disposition  Final diagnoses:   Infected dog bite of forearm - left     Time reflects when diagnosis was documented in both MDM as applicable and the Disposition within this note     Time User Action Codes Description Comment    5/22/2018  4:43 PM Sophie Giron Add [S51 859A,  L08 9,  W54  0XXA] Infected dog bite of forearm     5/22/2018  4:43 PM Sophie Giron Modify [S51 859A,  L08 9,  W54  0XXA] Infected dog bite of forearm left      ED Disposition     ED Disposition Condition Comment    Admit  Case was discussed with Dr Angeles Santiago and the patient's admission status was agreed to be Admission Status: inpatient status to the service of Dr Angeles Santiago   Follow-up Information    None         Patient's Medications   Discharge Prescriptions    No medications on file     No discharge procedures on file      ED Provider  Electronically Signed by           Saroj Bradford, DO  05/22/18 5[address] [facility] Jose Antonio, DO  05/22/18 1705

## 2018-05-22 NOTE — ASSESSMENT & PLAN NOTE
· Seen 2 days ago at the ED status post dog bite  Wounds were sutured and she was discharged on oral Augmentin which she took the following day  · Now with worsening redness, swelling with evidence of ascending lymphangitis  · Continue IV antibiotics with Unasyn, elevate extremity  · If swelling, redness does not improve in the next 24 hrs, will obtain ID/hand surgery evaluation

## 2018-05-23 PROBLEM — E66.01 MORBID OBESITY WITH BMI OF 50.0-59.9, ADULT (HCC): Status: ACTIVE | Noted: 2018-05-23

## 2018-05-23 LAB
ANION GAP SERPL CALCULATED.3IONS-SCNC: 10 MMOL/L (ref 4–13)
BUN SERPL-MCNC: 11 MG/DL (ref 5–25)
CALCIUM SERPL-MCNC: 8 MG/DL (ref 8.3–10.1)
CHLORIDE SERPL-SCNC: 105 MMOL/L (ref 100–108)
CO2 SERPL-SCNC: 24 MMOL/L (ref 21–32)
CREAT SERPL-MCNC: 0.81 MG/DL (ref 0.6–1.3)
ERYTHROCYTE [DISTWIDTH] IN BLOOD BY AUTOMATED COUNT: 12.7 % (ref 11.6–15.1)
GFR SERPL CREATININE-BSD FRML MDRD: 97 ML/MIN/1.73SQ M
GLUCOSE SERPL-MCNC: 100 MG/DL (ref 65–140)
HCT VFR BLD AUTO: 34.9 % (ref 34.8–46.1)
HGB BLD-MCNC: 11.2 G/DL (ref 11.5–15.4)
MCH RBC QN AUTO: 29.1 PG (ref 26.8–34.3)
MCHC RBC AUTO-ENTMCNC: 32.1 G/DL (ref 31.4–37.4)
MCV RBC AUTO: 91 FL (ref 82–98)
PLATELET # BLD AUTO: 232 THOUSANDS/UL (ref 149–390)
PMV BLD AUTO: 10.5 FL (ref 8.9–12.7)
POTASSIUM SERPL-SCNC: 3.8 MMOL/L (ref 3.5–5.3)
RBC # BLD AUTO: 3.85 MILLION/UL (ref 3.81–5.12)
SODIUM SERPL-SCNC: 139 MMOL/L (ref 136–145)
WBC # BLD AUTO: 6.61 THOUSAND/UL (ref 4.31–10.16)

## 2018-05-23 PROCEDURE — 85027 COMPLETE CBC AUTOMATED: CPT | Performed by: INTERNAL MEDICINE

## 2018-05-23 PROCEDURE — 80048 BASIC METABOLIC PNL TOTAL CA: CPT | Performed by: INTERNAL MEDICINE

## 2018-05-23 PROCEDURE — 99232 SBSQ HOSP IP/OBS MODERATE 35: CPT | Performed by: INTERNAL MEDICINE

## 2018-05-23 RX ADMIN — KETOROLAC TROMETHAMINE 15 MG: 30 INJECTION, SOLUTION INTRAMUSCULAR at 17:14

## 2018-05-23 RX ADMIN — KETOROLAC TROMETHAMINE 15 MG: 30 INJECTION, SOLUTION INTRAMUSCULAR at 04:49

## 2018-05-23 RX ADMIN — SODIUM CHLORIDE 3 G: 9 INJECTION, SOLUTION INTRAVENOUS at 05:04

## 2018-05-23 RX ADMIN — OXYCODONE HYDROCHLORIDE 2.5 MG: 5 TABLET ORAL at 00:15

## 2018-05-23 RX ADMIN — SODIUM CHLORIDE 3 G: 9 INJECTION, SOLUTION INTRAVENOUS at 11:56

## 2018-05-23 RX ADMIN — SODIUM CHLORIDE 3 G: 9 INJECTION, SOLUTION INTRAVENOUS at 17:13

## 2018-05-23 RX ADMIN — KETOROLAC TROMETHAMINE 15 MG: 30 INJECTION, SOLUTION INTRAMUSCULAR at 11:08

## 2018-05-23 RX ADMIN — OXYCODONE HYDROCHLORIDE 2.5 MG: 5 TABLET ORAL at 21:34

## 2018-05-23 RX ADMIN — SODIUM CHLORIDE 3 G: 9 INJECTION, SOLUTION INTRAVENOUS at 00:17

## 2018-05-23 NOTE — PROGRESS NOTES
Progress Note - Raina Woods 1985, 33 y o  female MRN: [id number]    Unit/Bed#: -01 Encounter: [id number]    Primary Care Provider: No primary care provider on file  Date and time admitted to hospital: 2018  3:29 PM    * Dog bite of left forearm with infection   Assessment & Plan    · Noted clinical improvement today with decreased erythema/swelling  She is afebrile, no leukocytosis  · Continue IV antibiotics with Unasyn, maintain extremity elevation  · Blood cultures pending      Morbid obesity with BMI of 50 0-59 9, adult (HCC)   Assessment & Plan    · Advice therapeutic lifestyle changes to promote weight loss        VTE Prophylaxis:  Low risk, ambulate    Patient Centered Rounds: I have performed bedside rounds with nursing staff today  Discussions with Specialists or Other Care Team Provider:  Nursing    Education and Discussions with Family / Patient:  Patient and grandmother at the bedside updated    Current Length of Stay: 1 day(s)    Current Patient Status: Inpatient   Certification Statement: The patient will continue to require additional inpatient hospital stay due to Infected dog bite requiring IV antibiotics    Discharge Plan:  Continue IV antibiotics another 24-48 hours    Code Status: Level 1 - Full Code      Subjective:   Patient reports no new complaints this morning  Left upper extremity pain, swelling and redness has improved  She denies fever    Objective:     Vitals:   Temp (24hrs), Av 9 °F (36 6 °C), Min:97 6 °F (36 4 °C), Max:98 2 °F (36 8 °C)    HR:  [83-88] 86  Resp:  [18] 18  BP: (127-154)/(72-89) 127/80  SpO2:  [96 %-100 %] 96 %  There is no height or weight on file to calculate BMI  Input and Output Summary (last 24 hours):        Intake/Output Summary (Last 24 hours) at 18 1510  Last data filed at 18 1156   Gross per 24 hour   Intake              580 ml   Output                0 ml   Net              580 ml       Physical Exam:  General Appearance:    Alert, cooperative, no distress, appropriately responsive    Head:    Normocephalic, without obvious abnormality, atraumatic, mucous membranes moist    Eyes:    Conjunctiva/corneas clear, EOM's intact   Neck:   Supple   Lungs:     Clear to auscultation bilaterally, respirations unlabored, no crackles or wheeze     Heart:    Regular rate and rhythm, S1 and S2    Abdomen:     Soft, obese, non-tender, nondistended   Extremities:   Improved left upper extremity swelling, erythema and tenderness  Improved range of motion left wrist and hand   Neurologic:  nonfocal         Additional Data:     Labs:      Results from last 7 days  Lab Units 05/23/18  0502 05/22/18  1558   WBC Thousand/uL 6 61 8 79   HEMOGLOBIN g/dL 11 2* 12 4   HEMATOCRIT % 34 9 37 9   PLATELETS Thousands/uL 232 266   NEUTROS PCT %  --  73   LYMPHS PCT %  --  19   MONOS PCT %  --  7   EOS PCT %  --  1       Results from last 7 days  Lab Units 05/23/18  0502   SODIUM mmol/L 139   POTASSIUM mmol/L 3 8   CHLORIDE mmol/L 105   CO2 mmol/L 24   BUN mg/dL 11   CREATININE mg/dL 0 81   CALCIUM mg/dL 8 0*   GLUCOSE RANDOM mg/dL 100           * I Have Reviewed All Lab Data Listed Above  * Additional Pertinent Lab Tests Reviewed:  Domonique 67 Admission Reviewed    Cultures:   Blood Culture: No results found for: Kaz Carroll  Urine Culture: No results found for: URINECX  Sputum Culture: No components found for: SPUTUMCX  Wound Culture:   Lab Results   Component Value Date    WOUNDCULT 1+ Growth of Escherichia coli 04/18/2017    WOUNDCULT 2+ Growth of Staphylococcus aureus 04/18/2017    WOUNDCULT 3+ Growth of Diphtheroids 04/18/2017       Last 24 Hours Medication List:     Current Facility-Administered Medications:  acetaminophen 650 mg Oral Q4H PRN Kim Cevallos MD    ampicillin-sulbactam 3 g Intravenous Q6H Kim Cevallos MD Last Rate: 3 g (05/23/18 1156)   ketorolac 15 mg Intravenous Q6H PRN Kim Cevallos MD    oxyCODONE 2 5 mg Oral Q4H PRN Aiden Garcia MD         Today, Patient Was Seen By: Aiden Garcia MD    ** Please Note: Dragon 360 Dictation voice to text software may have been used in the creation of this document   **

## 2018-05-23 NOTE — CASE MANAGEMENT
Initial Clinical Review    Admission: Date/Time/Statement: 5/22/18 @ 1645     Orders Placed This Encounter   Procedures    Inpatient Admission (expected length of stay for this patient is greater than two midnights)     Standing Status:   Standing     Number of Occurrences:   1     Order Specific Question:   Admitting Physician     Answer:   Tera Sacks [[id number]]     Order Specific Question:   Level of Care     Answer:   Med Surg [[id number]]     Order Specific Question:   Estimated length of stay     Answer:   More than 2 Midnights     Order Specific Question:   Certification     Answer:   I certify that inpatient services are medically necessary for this patient for a duration of greater than two midnights  See H&P and MD Progress Notes for additional information about the patient's course of treatment  ED: Date/Time/Mode of Arrival:   ED Arrival Information     Expected Arrival Acuity Means of Arrival Escorted By Service Admission Type    - 5/22/2018 14:50 Urgent Walk-In Self General Medicine Urgent    Arrival Complaint    Dog Bite/Infected arm          Chief Complaint:   Chief Complaint   Patient presents with    Dog Bite     Seen in ED sunday evening after being bit by dog, was told to return if redness worsened  Pt states it is now moving up her arm  History of Illness: 33 y o  female who initially presented to the ED on Sunday 2/20/18 after being bitten by her dog  At that time, wounds which involved left forearm was sutured and she also sustained bruising and bite to left shoulder as well  She was prescribed p o  Augmentin which she took yesterday  Due to worsening pain and redness however, she re-presented today having failed outpatient management  Patient has also noticed increasing warmth however no discharge from multiple abrasions and sutured wounds    She had a low-grade temp of 100 3° at the ED     ED Vital Signs:   ED Triage Vitals   Temperature Pulse Respirations Blood Pressure SpO2 05/22/18 1456 05/22/18 1456 05/22/18 1456 05/22/18 1456 05/22/18 1456   100 3 °F (37 9 °C) 88 18 146/88 100 %      Temp Source Heart Rate Source Patient Position - Orthostatic VS BP Location FiO2 (%)   05/22/18 1823 05/22/18 1456 05/22/18 1456 05/22/18 1456 --   Oral Monitor Lying Left arm       Pain Score       05/22/18 1703       7        Wt Readings from Last 1 Encounters:   05/20/18 (!) 138 kg (303 lb 6 4 oz)       Vital Signs (abnormal): maximum temperature 100  3            Abnormal Labs/Diagnostic Test Results:   5/23/2018  Calcium 8  Wbc 6 61, hgb 11 2  ED Treatment: blood cultures  Medication Administration from 05/22/2018 1449 to 05/22/2018 1805       Date/Time Order Dose Route Action Action by Comments     05/22/2018 1630 sodium chloride 0 9 % bolus 1,000 mL 1,000 mL Intravenous Lior 37 Ines [facility]      05/22/2018 1703 ketorolac (TORADOL) injection 15 mg 15 mg Intravenous Given Ines Capps RN      05/22/2018 1711 ampicillin-sulbactam (UNASYN) injection 3 g 3 g Intravenous Not Given Iens Capps RN      05/22/2018 1746 ampicillin-sulbactam (UNASYN) 3 g in sodium chloride 0 9 % 100 mL IVPB 0 g Intravenous Stopped Ines Capps RN      05/22/2018 1712 ampicillin-sulbactam (UNASYN) 3 g in sodium chloride 0 9 % 100 mL IVPB 3 g Intravenous New Bag Ines Capps RN           Past Medical/Surgical History: Active Ambulatory Problems     Diagnosis Date Noted    No Active Ambulatory Problems     Resolved Ambulatory Problems     Diagnosis Date Noted    No Resolved Ambulatory Problems     No Additional Past Medical History       Admitting Diagnosis: Arm injury [S49 90XA]  Infected dog bite of forearm [S51 859A, L08 9, W54  0XXA]    Age/Sex: 33 y o  female    Assessment/Plan:   Dog bite of left forearm with infection   Assessment & Plan     · Seen 2 days ago at the ED status post dog bite    Wounds were sutured and she was discharged on oral Augmentin which she took the following day  · Now with worsening redness, swelling with evidence of ascending lymphangitis  · Continue IV antibiotics with Unasyn, elevate extremity  · If swelling, redness does not improve in the next 24 hrs, will obtain ID/hand surgery evaluation  Admission Orders: 5/22/2018  1645 INPATIENT   Scheduled Meds:   Current Facility-Administered Medications:  ampicillin-sulbactam 3 g Intravenous Q6H Manjit Fulton MD Last Rate: 3 g (05/23/18 0504)     Continuous Infusions:    PRN Meds:   acetaminophen    Ketorolac 15 mg iv - used x 2      oxyCODONE - used x 2  OTHER ORDERS: wound care daily right arm - flush with saline  Thank you,  [facility] in the [facility] by [employer] for 2017  Network Utilization Review Department  Phone: [phone]; Fax [phone]  ATTENTION: The Network Utilization Review Department is now centralized for our 7 Facilities  Make a note that we have a new phone and fax numbers for our Department  Please call with any questions or concerns to [phone] and carefully follow the prompts so that you are directed to the right person  All voicemails are confidential  Fax any determinations, approvals, denials, and requests for initial or continue stay review clinical to [phone]  Due to HIGH CALL volume, it would be easier if you could please send faxed requests to expedite your requests and in part, help us provide discharge notifications faster

## 2018-05-23 NOTE — ASSESSMENT & PLAN NOTE
· Noted clinical improvement today with decreased erythema/swelling    She is afebrile, no leukocytosis  · Continue IV antibiotics with Unasyn, maintain extremity elevation

## 2018-05-24 LAB
BASOPHILS # BLD AUTO: 0.02 THOUSANDS/ΜL (ref 0–0.1)
BASOPHILS NFR BLD AUTO: 0 % (ref 0–1)
EOSINOPHIL # BLD AUTO: 0.14 THOUSAND/ΜL (ref 0–0.61)
EOSINOPHIL NFR BLD AUTO: 2 % (ref 0–6)
ERYTHROCYTE [DISTWIDTH] IN BLOOD BY AUTOMATED COUNT: 12.7 % (ref 11.6–15.1)
HCT VFR BLD AUTO: 34.6 % (ref 34.8–46.1)
HGB BLD-MCNC: 11.2 G/DL (ref 11.5–15.4)
LYMPHOCYTES # BLD AUTO: 1.89 THOUSANDS/ΜL (ref 0.6–4.47)
LYMPHOCYTES NFR BLD AUTO: 33 % (ref 14–44)
MCH RBC QN AUTO: 29.2 PG (ref 26.8–34.3)
MCHC RBC AUTO-ENTMCNC: 32.4 G/DL (ref 31.4–37.4)
MCV RBC AUTO: 90 FL (ref 82–98)
MONOCYTES # BLD AUTO: 0.47 THOUSAND/ΜL (ref 0.17–1.22)
MONOCYTES NFR BLD AUTO: 8 % (ref 4–12)
NEUTROPHILS # BLD AUTO: 3.23 THOUSANDS/ΜL (ref 1.85–7.62)
NEUTS SEG NFR BLD AUTO: 56 % (ref 43–75)
PLATELET # BLD AUTO: 234 THOUSANDS/UL (ref 149–390)
PMV BLD AUTO: 10.2 FL (ref 8.9–12.7)
RBC # BLD AUTO: 3.83 MILLION/UL (ref 3.81–5.12)
WBC # BLD AUTO: 5.75 THOUSAND/UL (ref 4.31–10.16)

## 2018-05-24 PROCEDURE — 99232 SBSQ HOSP IP/OBS MODERATE 35: CPT | Performed by: INTERNAL MEDICINE

## 2018-05-24 PROCEDURE — 85025 COMPLETE CBC W/AUTO DIFF WBC: CPT | Performed by: INTERNAL MEDICINE

## 2018-05-24 RX ADMIN — KETOROLAC TROMETHAMINE 15 MG: 30 INJECTION, SOLUTION INTRAMUSCULAR at 00:15

## 2018-05-24 RX ADMIN — OXYCODONE HYDROCHLORIDE 2.5 MG: 5 TABLET ORAL at 04:16

## 2018-05-24 RX ADMIN — SODIUM CHLORIDE 3 G: 9 INJECTION, SOLUTION INTRAVENOUS at 00:15

## 2018-05-24 RX ADMIN — KETOROLAC TROMETHAMINE 15 MG: 30 INJECTION, SOLUTION INTRAMUSCULAR at 07:27

## 2018-05-24 RX ADMIN — ACETAMINOPHEN 650 MG: 325 TABLET ORAL at 21:20

## 2018-05-24 RX ADMIN — SODIUM CHLORIDE 3 G: 9 INJECTION, SOLUTION INTRAVENOUS at 12:41

## 2018-05-24 RX ADMIN — KETOROLAC TROMETHAMINE 15 MG: 30 INJECTION, SOLUTION INTRAMUSCULAR at 15:38

## 2018-05-24 RX ADMIN — OXYCODONE HYDROCHLORIDE 2.5 MG: 5 TABLET ORAL at 11:21

## 2018-05-24 RX ADMIN — SODIUM CHLORIDE 3 G: 9 INJECTION, SOLUTION INTRAVENOUS at 06:21

## 2018-05-24 RX ADMIN — SODIUM CHLORIDE 3 G: 9 INJECTION, SOLUTION INTRAVENOUS at 17:01

## 2018-05-24 NOTE — PROGRESS NOTES
Progress Note - Kendra Ge 1985, 33 y o  female MRN: [id number]    Unit/Bed#: -01 Encounter: [id number]    Primary Care Provider: No primary care provider on file  Date and time admitted to hospital: 2018  3:29 PM    * Dog bite of left forearm with infection   Assessment & Plan    · Erythema, pain and swelling continues to improve  She remains afebrile without leukocytosis and is nontoxic appearing  · Continue extremity elevation and IV Unasyn through today with plan for transition to p o  Augmentin tomorrow and discharge home to complete total 10 days antibiotic course      Morbid obesity with BMI of 50 0-59 9, adult ([facility] )   Assessment & Plan    · Advice therapeutic lifestyle changes to promote weight loss        VTE Pharmacologic Prophylaxis:  Low risk, ambulate    Patient Centered Rounds: I have performed bedside rounds with nursing staff today  Discussions with Specialists or Other Care Team Provider:  Nursing    Education and Discussions with Family / Patient:  Patient    Current Length of Stay: 2 day(s)    Current Patient Status: Inpatient   Certification Statement: The patient will continue to require additional inpatient hospital stay due to Cellulitis requiring IV antibiotics    Discharge Plan:  Anticipate next 24 hr if continues to improve    Code Status: Level 1 - Full Code      Subjective:   Patient seen and evaluated, no new complaints, pain and swelling is down, overall clinically improved  Objective:     Vitals:   Temp (24hrs), Av 2 °F (36 8 °C), Min:97 7 °F (36 5 °C), Max:98 6 °F (37 °C)    HR:  [81-85] 81  Resp:  [18-19] 19  BP: (122-143)/(73-91) 129/73  SpO2:  [99 %-100 %] 99 %  There is no height or weight on file to calculate BMI  Input and Output Summary (last 24 hours):        Intake/Output Summary (Last 24 hours) at 18 1224  Last data filed at 18 0900   Gross per 24 hour   Intake              240 ml   Output                0 ml   Net 240 ml       Physical Exam:  General Appearance:    Alert, cooperative, no distress, appropriately responsive    Head:    Normocephalic, without obvious abnormality, atraumatic, mucous membranes moist    Eyes:    Conjunctiva/corneas clear, EOM's intact   Neck:   Supple   Lungs:     Clear to auscultation bilaterally, respirations unlabored, no crackles or wheeze     Heart:    Regular rate and rhythm, S1 and S2    Abdomen:     Soft, obese, non-tender, bowel sounds active all four quadrants,     no masses, no organomegaly   Extremities:   Decreased upper extremity erythema and swelling, sutures intact, range of motion much more improved today   Neurologic:  nonfocal         Additional Data:     Labs:      Results from last 7 days  Lab Units 05/24/18  0441   WBC Thousand/uL 5 75   HEMOGLOBIN g/dL 11 2*   HEMATOCRIT % 34 6*   PLATELETS Thousands/uL 234   NEUTROS PCT % 56   LYMPHS PCT % 33   MONOS PCT % 8   EOS PCT % 2       Results from last 7 days  Lab Units 05/23/18  0502   SODIUM mmol/L 139   POTASSIUM mmol/L 3 8   CHLORIDE mmol/L 105   CO2 mmol/L 24   BUN mg/dL 11   CREATININE mg/dL 0 81   CALCIUM mg/dL 8 0*   GLUCOSE RANDOM mg/dL 100           * I Have Reviewed All Lab Data Listed Above  * Additional Pertinent Lab Tests Reviewed:  Domonique [address] Admission Reviewed    Cultures:   Blood Culture:   Lab Results   Component Value Date    BLOODCX No Growth at 24 hrs  05/22/2018    BLOODCX No Growth at 24 hrs  05/22/2018     Urine Culture: No results found for: URINECX  Sputum Culture: No components found for: SPUTUMCX  Wound Culture:   Lab Results   Component Value Date    WOUNDCULT 1+ Growth of Escherichia coli 04/18/2017    WOUNDCULT 2+ Growth of Staphylococcus aureus 04/18/2017    WOUNDCULT 3+ Growth of Diphtheroids 04/18/2017       Last 24 Hours Medication List:     Current Facility-Administered Medications:  acetaminophen 650 mg Oral Q4H PRN Clemente Jensen MD    ampicillin-sulbactam 3 g Intravenous Q6H Willy Chery MD Last Rate: 3 g (05/24/18 5451)   ketorolac 15 mg Intravenous Q6H PRN Willy Chery MD    oxyCODONE 2 5 mg Oral Q4H PRN Willy Chery MD         Today, Patient Was Seen By: Willy Chery MD    ** Please Note: Dragon 360 Dictation voice to text software may have been used in the creation of this document   **

## 2018-05-24 NOTE — ASSESSMENT & PLAN NOTE
· Erythema, pain and swelling continues to improve  She remains afebrile without leukocytosis and is nontoxic appearing  · Continue extremity elevation and IV Unasyn through today with plan for transition to p o  Augmentin tomorrow and discharge home to complete total 10 days antibiotic course

## 2018-05-25 VITALS
HEART RATE: 66 BPM | SYSTOLIC BLOOD PRESSURE: 117 MMHG | OXYGEN SATURATION: 97 % | RESPIRATION RATE: 18 BRPM | TEMPERATURE: 97.7 F | DIASTOLIC BLOOD PRESSURE: 74 MMHG | HEIGHT: 64 IN

## 2018-05-25 PROCEDURE — 99239 HOSP IP/OBS DSCHRG MGMT >30: CPT | Performed by: INTERNAL MEDICINE

## 2018-05-25 RX ORDER — ACETAMINOPHEN 325 MG/1
650 TABLET ORAL EVERY 6 HOURS PRN
Qty: 30 TABLET | Refills: 0
Start: 2018-05-25

## 2018-05-25 RX ORDER — IBUPROFEN 400 MG/1
400 TABLET ORAL EVERY 6 HOURS PRN
Status: DISCONTINUED | OUTPATIENT
Start: 2018-05-25 | End: 2018-05-25 | Stop reason: HOSPADM

## 2018-05-25 RX ORDER — AMOXICILLIN AND CLAVULANATE POTASSIUM 875; 125 MG/1; MG/1
1 TABLET, FILM COATED ORAL EVERY 12 HOURS SCHEDULED
Refills: 0
Start: 2018-05-25 | End: 2018-06-01

## 2018-05-25 RX ORDER — IBUPROFEN 400 MG/1
400 TABLET ORAL EVERY 6 HOURS PRN
Refills: 0
Start: 2018-05-25

## 2018-05-25 RX ADMIN — IBUPROFEN 400 MG: 400 TABLET, FILM COATED ORAL at 10:15

## 2018-05-25 RX ADMIN — SODIUM CHLORIDE 3 G: 9 INJECTION, SOLUTION INTRAVENOUS at 01:00

## 2018-05-25 RX ADMIN — SODIUM CHLORIDE 3 G: 9 INJECTION, SOLUTION INTRAVENOUS at 05:53

## 2018-05-25 RX ADMIN — SODIUM CHLORIDE 3 G: 9 INJECTION, SOLUTION INTRAVENOUS at 11:53

## 2018-05-25 RX ADMIN — OXYCODONE HYDROCHLORIDE 2.5 MG: 5 TABLET ORAL at 07:25

## 2018-05-25 RX ADMIN — OXYCODONE HYDROCHLORIDE 2.5 MG: 5 TABLET ORAL at 17:28

## 2018-05-25 RX ADMIN — HYDROMORPHONE HYDROCHLORIDE 1 MG: 1 INJECTION, SOLUTION INTRAMUSCULAR; INTRAVENOUS; SUBCUTANEOUS at 13:35

## 2018-05-25 NOTE — CONSULTS
Consultation -General Surgery  Gabriela Christiansen 33 y o  female MRN: [id number]  Unit/Bed#: -01 Encounter: [id number]        Consults    ASSESSMENT/PLAN:    33year old female with cellulitis from dog bite to left forearm    - will plan on suture removal today  - antibiotics per primary  - no need for follow up with hand surgery  Ok to discharge from surgery standpoint  - local wound care  - please call with any questions    Problem List     * (Principal)Dog bite of left forearm with infection    Morbid obesity with BMI of 50 0-59 9, adult ([facility] )          Reason for Consult / Principal Problem: dog bite left forearm    HPI: Gabriela Christiansen is a 33y o  year old female who presents with Dog bite of left forearm with infection  Pt was bitten on 5/20/18 by family dog  Was seen in ED for suture placement and sent home on PO Augmentin  Pt returned on 5/22/18 due to erythema, swelling, and fever  Was started on IV Unasyn  Swelling and erythema have improved  Pt currently has full ROM of left hand and wrist  Pt is right handed  Review of Systems   Constitutional: Positive for chills and fever  Gastrointestinal: Negative for abdominal distention, abdominal pain, constipation and diarrhea  Genitourinary: Negative for dysuria  Skin: Positive for color change and wound  Historical Information   Past Medical History:   Diagnosis Date    Morbid obesity with BMI of 50 0-59 9, adult ([facility] ) 5/23/2018     Past Surgical History:   Procedure Laterality Date    CHOLECYSTECTOMY       Social History   History   Alcohol Use No     History   Drug Use No     History   Smoking Status    Never Smoker   Smokeless Tobacco    Never Used     History reviewed  No pertinent family history  Meds/Allergies     No prescriptions prior to admission       Current Facility-Administered Medications   Medication Dose Route Frequency    acetaminophen (TYLENOL) tablet 650 mg  650 mg Oral Q4H PRN    ampicillin-sulbactam (UNASYN) 3 g in sodium chloride 0 9 % 100 mL IVPB  3 g Intravenous Q6H    HYDROmorphone (DILAUDID) injection 1 mg  1 mg Intravenous Once    ibuprofen (MOTRIN) tablet 400 mg  400 mg Oral Q6H PRN    oxyCODONE (ROXICODONE) IR tablet 2 5 mg  2 5 mg Oral Q4H PRN       Allergies   Allergen Reactions    Cefaclor Hives     Other reaction(s): hives, joint pain    Cephalosporins     Wheat Bran        Objective     Blood pressure 141/77, pulse 85, temperature 97 9 °F (36 6 °C), temperature source Oral, resp  rate 16, height 5' 4" (1 626 m), last menstrual period 04/29/2018, SpO2 98 %  Intake/Output Summary (Last 24 hours) at 05/25/18 1332  Last data filed at 05/25/18 1015   Gross per 24 hour   Intake              240 ml   Output                0 ml   Net              240 ml       PHYSICAL EXAM  General appearance: alert and oriented, in no acute distress  Skin: Erythema to left forearm, outlined, erythema does not pass outline  5 incisions to forearm closed with sutures, healing  LUE: ROM fully intact  Sensation intact  Pulses intact  Lab Results:   Admission on 05/22/2018   Component Date Value    WBC 05/22/2018 8 79     RBC 05/22/2018 4 21     Hemoglobin 05/22/2018 12 4     Hematocrit 05/22/2018 37 9     MCV 05/22/2018 90     MCH 05/22/2018 29 5     MCHC 05/22/2018 32 7     RDW 05/22/2018 12 8     MPV 05/22/2018 10 1     Platelets 68/93/6387 266     Neutrophils Relative 05/22/2018 73     Lymphocytes Relative 05/22/2018 19     Monocytes Relative 05/22/2018 7     Eosinophils Relative 05/22/2018 1     Basophils Relative 05/22/2018 0     Neutrophils Absolute 05/22/2018 6 37     Lymphocytes Absolute 05/22/2018 1 67     Monocytes Absolute 05/22/2018 0 64     Eosinophils Absolute 05/22/2018 0 08     Basophils Absolute 05/22/2018 0 03     LACTIC ACID 05/22/2018 0 8     Blood Culture 05/22/2018 No Growth at 48 hrs   Blood Culture 05/22/2018 No Growth at 48 hrs       Sodium 05/22/2018 139     Potassium 05/22/2018 3 5     Chloride 05/22/2018 101     CO2 05/22/2018 26     Anion Gap 05/22/2018 12     BUN 05/22/2018 13     Creatinine 05/22/2018 0 91     Glucose 05/22/2018 88     Calcium 05/22/2018 9 0     eGFR 05/22/2018 84     EXT PREG TEST UR (Ref: N* 05/22/2018 negative     Sodium 05/23/2018 139     Potassium 05/23/2018 3 8     Chloride 05/23/2018 105     CO2 05/23/2018 24     Anion Gap 05/23/2018 10     BUN 05/23/2018 11     Creatinine 05/23/2018 0 81     Glucose 05/23/2018 100     Calcium 05/23/2018 8 0*    eGFR 05/23/2018 97     WBC 05/23/2018 6 61     RBC 05/23/2018 3 85     Hemoglobin 05/23/2018 11 2*    Hematocrit 05/23/2018 34 9     MCV 05/23/2018 91     MCH 05/23/2018 29 1     MCHC 05/23/2018 32 1     RDW 05/23/2018 12 7     Platelets 61/61/3690 232     MPV 05/23/2018 10 5     WBC 05/24/2018 5 75     RBC 05/24/2018 3 83     Hemoglobin 05/24/2018 11 2*    Hematocrit 05/24/2018 34 6*    MCV 05/24/2018 90     MCH 05/24/2018 29 2     MCHC 05/24/2018 32 4     RDW 05/24/2018 12 7     MPV 05/24/2018 10 2     Platelets 18/50/4877 234     Neutrophils Relative 05/24/2018 56     Lymphocytes Relative 05/24/2018 33     Monocytes Relative 05/24/2018 8     Eosinophils Relative 05/24/2018 2     Basophils Relative 05/24/2018 0     Neutrophils Absolute 05/24/2018 3 23     Lymphocytes Absolute 05/24/2018 1 89     Monocytes Absolute 05/24/2018 0 47     Eosinophils Absolute 05/24/2018 0 14     Basophils Absolute 05/24/2018 0 02            Counseling / Coordination of Care  Total time spent today  20 minutes  Greater than 50% of total time was spent with the patient and / or family counseling and / or coordination of care         Deanna Antonio PA-C

## 2018-05-25 NOTE — PROGRESS NOTES
Progress Note - Raina Woods 1985, 33 y o  female MRN: [id number]    Unit/Bed#: -Higinio Encounter: [id number]    Primary Care Provider: No primary care provider on file  Date and time admitted to hospital: 2018  3:29 PM    * Dog bite of left forearm with infection   Assessment & Plan    · Appears overall clinically improved however area around incisions without significant change in the past 24 hours  Range of motion and swelling in hands much improved  · Continue IV Unasyn at this time and obtain hand surgery eval      Morbid obesity with BMI of 50 0-59 9, adult (HCC)   Assessment & Plan    · Advice therapeutic lifestyle changes to promote weight loss        VTE Pharmacologic Prophylaxis: Low risk, ambulate    Patient Centered Rounds: I have performed bedside rounds with nursing staff today  Discussions with Specialists or Other Care Team Provider:  Nursing    Education and Discussions with Family / Patient:  Patient    Current Length of Stay: 3 day(s)    Current Patient Status: Inpatient   Certification Statement: The patient will continue to require additional inpatient hospital stay due to Above diagnosis and care plan    Discharge Plan: Will have Hand surgery evaluate prior to discharge     Code Status: Level 1 - Full Code      Subjective:   Patient seen and evaluated  Complains of pain left  Reports swelling of left hand is improved  Redness continues to decrease    Objective:     Vitals:   Temp (24hrs), Av 6 °F (37 °C), Min:97 9 °F (36 6 °C), Max:99 6 °F (37 6 °C)    HR:  [] 85  Resp:  [16-20] 16  BP: (135-184)/(65-77) 141/77  SpO2:  [97 %-100 %] 98 %  There is no height or weight on file to calculate BMI  Input and Output Summary (last 24 hours):        Intake/Output Summary (Last 24 hours) at 18 0836  Last data filed at 18 1101   Gross per 24 hour   Intake              480 ml   Output                0 ml   Net              480 ml       Physical Exam:  General Appearance:    Alert, cooperative, no distress, appropriately responsive    Head:    Normocephalic, without obvious abnormality, atraumatic, mucous membranes moist    Eyes:    Conjunctiva/corneas clear, EOM's intact   Neck:   Supple   Lungs:     Clear to auscultation bilaterally, respirations unlabored, no crackles or wheeze     Heart:    Regular rate and rhythm, S1 and S2    Abdomen:     Soft, obese, non-tender, bowel sounds active all four quadrants,     no masses, no organomegaly   Extremities:   Improved range of motion left hand, erythema about the same compared to day prior, sutures remain intact   Neurologic:  nonfocal         Additional Data:     Labs:      Results from last 7 days  Lab Units 05/24/18  0441   WBC Thousand/uL 5 75   HEMOGLOBIN g/dL 11 2*   HEMATOCRIT % 34 6*   PLATELETS Thousands/uL 234   NEUTROS PCT % 56   LYMPHS PCT % 33   MONOS PCT % 8   EOS PCT % 2       Results from last 7 days  Lab Units 05/23/18  0502   SODIUM mmol/L 139   POTASSIUM mmol/L 3 8   CHLORIDE mmol/L 105   CO2 mmol/L 24   BUN mg/dL 11   CREATININE mg/dL 0 81   CALCIUM mg/dL 8 0*   GLUCOSE RANDOM mg/dL 100           * I Have Reviewed All Lab Data Listed Above  * Additional Pertinent Lab Tests Reviewed:  Domonique [address] Admission Reviewed    Cultures:   Blood Culture:   Lab Results   Component Value Date    BLOODCX No Growth at 48 hrs  05/22/2018    BLOODCX No Growth at 48 hrs  05/22/2018     Urine Culture: No results found for: URINECX  Sputum Culture: No components found for: SPUTUMCX  Wound Culture:   Lab Results   Component Value Date    WOUNDCULT 1+ Growth of Escherichia coli 04/18/2017    WOUNDCULT 2+ Growth of Staphylococcus aureus 04/18/2017    WOUNDCULT 3+ Growth of Diphtheroids 04/18/2017       Last 24 Hours Medication List:     Current Facility-Administered Medications:  acetaminophen 650 mg Oral Q4H PRN Charlette Benavidez MD    ampicillin-sulbactam 3 g Intravenous Q6H Charlette Benavidez MD Last Rate: 3 g (05/25/18 0553)   oxyCODONE 2 5 mg Oral Q4H PRN Glory Dhillon MD         Today, Patient Was Seen By: Glory Dhillon MD    ** Please Note: Dragon 360 Dictation voice to text software may have been used in the creation of this document   **

## 2018-05-25 NOTE — ASSESSMENT & PLAN NOTE
· Appears overall clinically improved however area around incisions without significant change in the past 24 hours    Range of motion and swelling in hands much improved  · Continue IV Unasyn at this time and obtain hand surgery eval

## 2018-05-25 NOTE — DISCHARGE SUMMARY
Discharge Summary - [facility] Internal Medicine    Patient Information: Radha Hidalgo 33 y o  female MRN: [id number]  Unit/Bed#: -01 Encounter: [id number]    Discharging Physician / Practitioner: Clemente Jensen MD  PCP: No primary care provider on file  Admission Date: 5/22/2018  Discharge Date: 05/25/18    Reason for Admission:  Cellulitis left arm post animal bite    Discharge Diagnoses:     Principal Problem:    Dog bite of left forearm with infection  Active Problems: Morbid obesity with BMI of 50 0-59 9, adult [facility])      Consultations During Hospital Stay:  · Hand surgery    Procedures Performed:   · Suture removal    Significant findings:  · None    Hospital Course:   Radha Hidalgo is a 33 y o  female patient who originally presented to the hospital on 5/22/2018 due to left forearm cellulitis status post dog bite  Patient had failed outpatient antibiotics and had worsening of erythema and swelling with pain prompting her to re present at the emergency department  She was admitted and started on IV antibiotics with Unasyn  She had improvement of redness and swelling  She was evaluated by the hand surgeon and sutures were taken out  She was transition to oral Augmentin and discharged home in stable condition    Condition at Discharge: stable     Discharge Day Visit / Exam:     See same day progress note     Discharge instructions/Information to patient and family:   See after visit summary for information provided to patient and family  Provisions for Follow-Up Care:  See after visit summary for information related to follow-up care and any pertinent home health orders  Disposition: Home      Discharge Statement:  I spent >30 minutes discharging the patient  This time was spent on the day of discharge  I had direct contact with the patient on the day of discharge   Greater than 50% of the total time was spent examining patient, answering all patient questions, arranging and discussing plan of care with patient as well as directly providing post-discharge instructions  Additional time then spent on discharge activities  Discharge Medications:  See after visit summary for reconciled discharge medications provided to patient and family  ** Please Note: Dragon 360 Dictation voice to text software may have been used in the creation of this document   **

## 2018-05-27 LAB
BACTERIA BLD CULT: NORMAL
BACTERIA BLD CULT: NORMAL

## 2018-05-29 NOTE — CASE MANAGEMENT
Notification of Discharge  This is a Notification of Discharge from our facility [address]  Please be advised that this patient has been discharge from our facility  Below you will find the admission and discharge date and time including the patients disposition  PRESENTATION DATE: 5/22/2018  3:29 PM  IP ADMISSION DATE: 5/22/18 1645  DISCHARGE DATE: 5/25/2018  8:00 PM  DISPOSITION: Home/Self Care    [address] [facility] in the [facility] by [employer] for 2017  Network Utilization Review Department  Phone: [phone]; Fax [phone]  ATTENTION: The Network Utilization Review Department is now centralized for our 7 Facilities  Make a note that we have a new phone and fax numbers for our Department  Please call with any questions or concerns to [phone] and carefully follow the prompts so that you are directed to the right person  All voicemails are confidential  Fax any determinations, approvals, denials, and requests for initial or continue stay review clinical to [phone]  Due to HIGH CALL volume, it would be easier if you could please send faxed requests to expedite your requests and in part, help us provide discharge notifications faster        Reference #[id number]

## 2018-12-22 ENCOUNTER — OFFICE VISIT (OUTPATIENT)
Dept: URGENT CARE | Age: 33
End: 2018-12-22
Payer: COMMERCIAL

## 2018-12-22 VITALS
OXYGEN SATURATION: 97 % | TEMPERATURE: 98.9 F | HEART RATE: 86 BPM | BODY MASS INDEX: 50.02 KG/M2 | SYSTOLIC BLOOD PRESSURE: 138 MMHG | HEIGHT: 64 IN | DIASTOLIC BLOOD PRESSURE: 89 MMHG | WEIGHT: 293 LBS | RESPIRATION RATE: 18 BRPM

## 2018-12-22 DIAGNOSIS — H66.93 BILATERAL OTITIS MEDIA, UNSPECIFIED OTITIS MEDIA TYPE: Primary | ICD-10-CM

## 2018-12-22 PROCEDURE — S9083 URGENT CARE CENTER GLOBAL: HCPCS | Performed by: FAMILY MEDICINE

## 2018-12-22 PROCEDURE — G0382 LEV 3 HOSP TYPE B ED VISIT: HCPCS | Performed by: FAMILY MEDICINE

## 2018-12-22 RX ORDER — AMOXICILLIN 875 MG/1
875 TABLET, COATED ORAL 2 TIMES DAILY
Qty: 14 TABLET | Refills: 0 | Status: SHIPPED | OUTPATIENT
Start: 2018-12-22 | End: 2018-12-29

## 2018-12-22 NOTE — PROGRESS NOTES
Assessment/Plan    Bilateral otitis media, unspecified otitis media type [H66 93]  1  Bilateral otitis media, unspecified otitis media type  amoxicillin (AMOXIL) 875 mg tablet     - she has allergy to ceclor and cephalosporins  - states she used amox and Augmentin in past with no problems  - prescribed amox 875 mg BID for ear infection  - advised to f/u with pcp to ensure complete resolution        Subjective:  Presents to the clinic for loss of hearing in the left ear     Patient ID: Twin Gunter is a 29 y o  female  Reason For Visit / Chief Complaint  Chief Complaint   Patient presents with    Cold Like Symptoms     X TUESDAY, HEAD CONGESTION/COLD , PROGRESSED TO EAR PAIN, YESTERDAY LOSS OF VOICE AND CHEST CONGESTIION          HPI  She reports she has been loss of hearing in the left ear for several days and started having pain both ears yesterday  Has had upper respiratory infection for several days  Including coughing, loss of her voice and mild runny nose  Last ear infection was about 1 year ago, per patient  Past Medical History:   Diagnosis Date    Morbid obesity with BMI of 50 0-59 9, adult ([facility] ) 5/23/2018       Past Surgical History:   Procedure Laterality Date    CHOLECYSTECTOMY         No family history on file  Review of Systems   Constitutional: Negative for appetite change, chills and fever  HENT: Positive for congestion (nose and sinuses), ear pain, postnasal drip, rhinorrhea and sinus pressure  Negative for nosebleeds, sore throat and trouble swallowing  Respiratory: Positive for cough and shortness of breath  Negative for chest tightness and wheezing  Cardiovascular: Negative for chest pain and palpitations  Gastrointestinal: Negative for nausea and vomiting         Objective:    /89   Pulse 86   Temp 98 9 °F (37 2 °C) (Temporal)   Resp 18   Ht 5' 4" (1 626 m)   Wt (!) 138 kg (304 lb)   LMP 11/30/2018   SpO2 97%   BMI 52 18 kg/m²     Physical Exam HENT:   B/L ear TM with moderate to severe redness, cloudy TMs, mild swelling in the vicinity of the TMs  Nose 2+  Moderate nasal discharge  Eyes: Right eye exhibits no discharge  Left eye exhibits no discharge  Cardiovascular: Normal rate, regular rhythm and normal heart sounds  Pulmonary/Chest: Effort normal and breath sounds normal  No respiratory distress  She has no wheezes  Lymphadenopathy:     She has no cervical adenopathy

## 2018-12-22 NOTE — PATIENT INSTRUCTIONS
Otitis Media   WHAT YOU NEED TO KNOW:   Otitis media is an ear infection  DISCHARGE INSTRUCTIONS:   Medicines:  · Ibuprofen or acetaminophen  helps decrease your pain and fever  They are available without a doctor's order  Ask your healthcare provider which medicine is right for you  Ask how much to take and how often to take it  These medicines can cause stomach bleeding if not taken correctly  Ibuprofen can cause kidney damage  Do not take ibuprofen if you have kidney disease, an ulcer, or allergies to aspirin  Acetaminophen can cause liver damage  Do not drink alcohol if you take acetaminophen  · Ear drops  help treat your ear pain  · Antibiotics  help treat a bacterial infection that caused your ear infection  · Take your medicine as directed  Contact your healthcare provider if you think your medicine is not helping or if you have side effects  Tell him or her if you are allergic to any medicine  Keep a list of the medicines, vitamins, and herbs you take  Include the amounts, and when and why you take them  Bring the list or the pill bottles to follow-up visits  Carry your medicine list with you in case of an emergency  Heat or ice:   · Heat  may be used to decrease your pain  Place a warm, moist washcloth on your ear  Apply for 15 to 20 minutes, 3 to 4 times a day    · Ice  helps decrease swelling and pain  Use an ice pack or put crushed ice in a plastic bag  Cover the ice pack with a towel and place it on your ear for 15 to 20 minutes, 3 to 4 times a day for 2 days  Prevent otitis media:   · Wash your hands often  Use soap and water  Wash your hands after you use the bathroom, change a child's diapers, or sneeze  Wash your hands before you prepare or eat food  · Stay away from people who are ill  Some germs are easily and quickly spread through contact  Return to work or school: You may return to work or school when your fever is gone     Follow up with your healthcare provider as directed:  Write down your questions so you remember to ask them during your visits  Contact your healthcare provider if:   · Your ear pain gets worse or does not go away, even after treatment  · The outside of your ear is red or swollen  · You have vomiting or diarrhea  · You have fluid coming from your ear  · You have questions or concerns about your condition or care  Return to the emergency department if:   · You have a seizure  · You have a fever and a stiff neck  © 2017 [facility] Information is for End User's use only and may not be sold, redistributed or otherwise used for commercial purposes  All illustrations and images included in CareNotes® are the copyrighted property of A D A M , Inc  or Hugh Cordon  The above information is an  only  It is not intended as medical advice for individual conditions or treatments  Talk to your doctor, nurse or pharmacist before following any medical regimen to see if it is safe and effective for you

## 2019-01-07 ENCOUNTER — OFFICE VISIT (OUTPATIENT)
Dept: URGENT CARE | Age: 34
End: 2019-01-07
Payer: COMMERCIAL

## 2019-01-07 VITALS
DIASTOLIC BLOOD PRESSURE: 89 MMHG | HEIGHT: 64 IN | SYSTOLIC BLOOD PRESSURE: 143 MMHG | RESPIRATION RATE: 18 BRPM | OXYGEN SATURATION: 100 % | TEMPERATURE: 98 F | WEIGHT: 293 LBS | BODY MASS INDEX: 50.02 KG/M2 | HEART RATE: 71 BPM

## 2019-01-07 DIAGNOSIS — J01.00 ACUTE MAXILLARY SINUSITIS, RECURRENCE NOT SPECIFIED: Primary | ICD-10-CM

## 2019-01-07 PROCEDURE — S9083 URGENT CARE CENTER GLOBAL: HCPCS | Performed by: FAMILY MEDICINE

## 2019-01-07 PROCEDURE — G0382 LEV 3 HOSP TYPE B ED VISIT: HCPCS | Performed by: FAMILY MEDICINE

## 2019-01-07 RX ORDER — SULFAMETHOXAZOLE AND TRIMETHOPRIM 800; 160 MG/1; MG/1
1 TABLET ORAL EVERY 12 HOURS SCHEDULED
Qty: 10 TABLET | Refills: 0 | Status: SHIPPED | OUTPATIENT
Start: 2019-01-07 | End: 2019-01-07 | Stop reason: ALTCHOICE

## 2019-01-07 RX ORDER — SULFAMETHOXAZOLE AND TRIMETHOPRIM 800; 160 MG/1; MG/1
1 TABLET ORAL EVERY 12 HOURS SCHEDULED
Qty: 20 TABLET | Refills: 0 | Status: SHIPPED | OUTPATIENT
Start: 2019-01-07 | End: 2019-01-17

## 2019-01-07 NOTE — PROGRESS NOTES
[facility]        NAME: Joya Turner is a 29 y o  female  : 1985    MRN: [id number]  DATE: 2019  TIME: 4:48 PM    Assessment and Plan   Acute maxillary sinusitis, recurrence not specified [J01 00]  1  Acute maxillary sinusitis, recurrence not specified  sulfamethoxazole-trimethoprim (BACTRIM DS) 800-160 mg per tablet         Patient Instructions       Follow up with PCP in 3-5 days  Proceed to  ER if symptoms worsen  Chief Complaint     Chief Complaint   Patient presents with    Wheezing     left ear clogged , head congestion and feels tire it started this morning    Shortness of Breath    Earache    Cough         History of Present Illness       Patient for evaluation of sinus congestion, pressure, ear pressure  Patient was seen here about 2 weeks ago for bilateral ear infections and was on amoxicillin 875 for 7 days  Patient felt a little bit better but symptoms have returned and are persisting  She still has a sinus congestion and pressure with mucopurulent drainage          Review of Systems   Review of Systems      Current Medications       Current Outpatient Prescriptions:     acetaminophen (TYLENOL) 325 mg tablet, Take 2 tablets (650 mg total) by mouth every 6 (six) hours as needed for mild pain, Disp: 30 tablet, Rfl: 0    ibuprofen (MOTRIN) 400 mg tablet, Take 1 tablet (400 mg total) by mouth every 6 (six) hours as needed for mild pain Do not take on an empty stomach, Disp: , Rfl: 0    sulfamethoxazole-trimethoprim (BACTRIM DS) 800-160 mg per tablet, Take 1 tablet by mouth every 12 (twelve) hours for 5 days, Disp: 10 tablet, Rfl: 0    Current Allergies     Allergies as of 2019 - Reviewed 2019   Allergen Reaction Noted    Cefaclor Hives     Cephalosporins  2017    Wheat bran  2015            The following portions of the patient's history were reviewed and updated as appropriate: allergies, current medications, past family history, past medical history, past social history, past surgical history and problem list      Past Medical History:   Diagnosis Date    Morbid obesity with BMI of 50 0-59 9, adult ([facility] ) 5/23/2018       Past Surgical History:   Procedure Laterality Date    CHOLECYSTECTOMY         History reviewed  No pertinent family history  Medications have been verified  Objective   /89   Pulse 71   Temp 98 °F (36 7 °C) (Temporal)   Resp 18   Ht 5' 4" (1 626 m)   Wt (!) 138 kg (304 lb)   LMP 01/04/2019 (Approximate)   SpO2 100%   BMI 52 18 kg/m²        Physical Exam     Physical Exam   Constitutional: She is oriented to person, place, and time  She appears well-developed and well-nourished  No distress  HENT:   Head: Normocephalic and atraumatic  Right Ear: External ear normal    Left Ear: External ear normal    Bilateral nasal congestion erythema with mucopurulent drainage  Bilateral tonsillar erythema with no soft tissue swelling  No exudate  TMs intact bilaterally with clear fluid in the middle ear bilaterally  Positive retraction on the left  Eyes: Pupils are equal, round, and reactive to light  Conjunctivae and EOM are normal    Cardiovascular: Normal rate and regular rhythm  No murmur heard  Pulmonary/Chest: Effort normal and breath sounds normal  No respiratory distress  She has no wheezes  She has no rales  Lymphadenopathy:     She has cervical adenopathy  Neurological: She is alert and oriented to person, place, and time  Skin: Skin is warm and dry  Psychiatric: She has a normal mood and affect  Her behavior is normal    Nursing note and vitals reviewed

## 2019-01-13 ENCOUNTER — APPOINTMENT (EMERGENCY)
Dept: RADIOLOGY | Facility: HOSPITAL | Age: 34
End: 2019-01-13
Payer: COMMERCIAL

## 2019-01-13 ENCOUNTER — HOSPITAL ENCOUNTER (EMERGENCY)
Facility: HOSPITAL | Age: 34
Discharge: HOME | End: 2019-01-13
Attending: EMERGENCY MEDICINE
Payer: COMMERCIAL

## 2019-01-13 VITALS
TEMPERATURE: 98.1 F | DIASTOLIC BLOOD PRESSURE: 88 MMHG | SYSTOLIC BLOOD PRESSURE: 156 MMHG | OXYGEN SATURATION: 98 % | HEART RATE: 79 BPM | RESPIRATION RATE: 18 BRPM

## 2019-01-13 DIAGNOSIS — S93.492A SPRAIN OF ANTERIOR TALOFIBULAR LIGAMENT OF LEFT ANKLE, INITIAL ENCOUNTER: Primary | ICD-10-CM

## 2019-01-13 PROCEDURE — 96372 THER/PROPH/DIAG INJ SC/IM: CPT

## 2019-01-13 PROCEDURE — 99283 EMERGENCY DEPT VISIT LOW MDM: CPT

## 2019-01-13 PROCEDURE — 73610 X-RAY EXAM OF ANKLE: CPT

## 2019-01-13 RX ORDER — KETOROLAC TROMETHAMINE 30 MG/ML
15 INJECTION, SOLUTION INTRAMUSCULAR; INTRAVENOUS ONCE
Status: DISCONTINUED | OUTPATIENT
Start: 2019-01-13 | End: 2019-01-13

## 2019-01-13 RX ORDER — IBUPROFEN 600 MG/1
600 TABLET ORAL EVERY 6 HOURS PRN
Qty: 20 TABLET | Refills: 0 | Status: SHIPPED | OUTPATIENT
Start: 2019-01-13

## 2019-01-13 RX ORDER — KETOROLAC TROMETHAMINE 30 MG/ML
15 INJECTION, SOLUTION INTRAMUSCULAR; INTRAVENOUS ONCE
Status: COMPLETED | OUTPATIENT
Start: 2019-01-13 | End: 2019-01-13

## 2019-01-13 RX ADMIN — KETOROLAC TROMETHAMINE 15 MG: 30 INJECTION, SOLUTION INTRAMUSCULAR at 22:11

## 2019-01-14 NOTE — DISCHARGE INSTRUCTIONS

## 2019-01-14 NOTE — ED PROVIDER NOTES
History  Chief Complaint   Patient presents with    Ankle Injury     pt  tripped on bottom step and left ankle went sideways, golfball size lump to left ankle  History provided by:  Patient  Ankle Injury   Location:  Left ankle  Quality:  Sharp  Severity:  Moderate  Onset quality:  Sudden  Duration:  2 hours  Timing:  Constant  Progression:  Unchanged  Chronicity:  New  Context:  Rolled ankle on last step  Relieved by:  Nothing  Worsened by:  Nothing  Associated symptoms: no abdominal pain, no chest pain, no congestion, no cough, no diarrhea, no ear pain, no fatigue, no fever, no headaches, no loss of consciousness, no myalgias, no nausea, no rash, no shortness of breath, no sore throat, no vomiting and no wheezing        Prior to Admission Medications   Prescriptions Last Dose Informant Patient Reported? Taking?   acetaminophen (TYLENOL) 325 mg tablet   No No   Sig: Take 2 tablets (650 mg total) by mouth every 6 (six) hours as needed for mild pain   ibuprofen (MOTRIN) 400 mg tablet   No No   Sig: Take 1 tablet (400 mg total) by mouth every 6 (six) hours as needed for mild pain Do not take on an empty stomach   sulfamethoxazole-trimethoprim (BACTRIM DS) 800-160 mg per tablet   No No   Sig: Take 1 tablet by mouth every 12 (twelve) hours for 10 days      Facility-Administered Medications: None       Past Medical History:   Diagnosis Date    Morbid [facility] with BMI of 50 0-59 9, [facility]       Past Surgical History:   Procedure Laterality Date    CHOLECYSTECTOMY         History reviewed  No pertinent family history  I have reviewed and agree with the history as documented  Social History   Substance Use Topics    Smoking status: Never Smoker    Smokeless tobacco: Never Used    Alcohol use No        Review of Systems   Constitutional: Negative for activity change, chills, fatigue and fever  HENT: Negative for congestion, ear pain, mouth sores, sore throat and trouble swallowing      Eyes: Negative for photophobia and visual disturbance  Respiratory: Negative for cough, chest tightness, shortness of breath and wheezing  Cardiovascular: Negative for chest pain and palpitations  Gastrointestinal: Negative for abdominal pain, constipation, diarrhea, nausea and vomiting  Genitourinary: Negative for decreased urine volume, difficulty urinating, dysuria, genital sores and hematuria  Musculoskeletal: Positive for gait problem and joint swelling  Negative for arthralgias, myalgias, neck pain and neck stiffness  Skin: Negative for rash and wound  Neurological: Negative for dizziness, loss of consciousness, syncope, weakness, light-headedness, numbness and headaches  Hematological: Negative for adenopathy  All other systems reviewed and are negative  Physical Exam  Physical Exam   Constitutional: She is oriented to person, place, and time  She appears well-developed and well-nourished  No distress  HENT:   Head: Normocephalic and atraumatic  Right Ear: External ear normal    Left Ear: External ear normal    Nose: Nose normal    Mouth/Throat: Oropharynx is clear and moist    Eyes: Pupils are equal, round, and reactive to light  Conjunctivae and EOM are normal  No scleral icterus  Neck: Normal range of motion  Neck supple  Cardiovascular: Normal rate, regular rhythm, normal heart sounds and intact distal pulses  Exam reveals no gallop and no friction rub  No murmur heard  Pulmonary/Chest: Effort normal and breath sounds normal  No respiratory distress  She has no wheezes  Abdominal: Soft  She exhibits no distension  There is no tenderness  There is no guarding  Musculoskeletal: Normal range of motion  She exhibits tenderness  She exhibits no edema or deformity  Swelling of left lateral ankle TTP of left lateral malleolus  No laxity of ankle joint  Lymphadenopathy:     She has no cervical adenopathy  Neurological: She is alert and oriented to person, place, and time  No sensory deficit  She exhibits normal muscle tone  Skin: Skin is warm and dry  Capillary refill takes less than 2 seconds  She is not diaphoretic  No erythema  Nursing note and vitals reviewed  Vital Signs  ED Triage Vitals [01/13/19 2112]   Temperature Pulse Respirations Blood Pressure SpO2   98 1 °F (36 7 °C) 79 18 156/88 98 %      Temp Source Heart Rate Source Patient Position - Orthostatic VS BP Location FiO2 (%)   Oral Monitor Sitting Right arm --      Pain Score       8           Vitals:    01/13/19 2112   BP: 156/88   Pulse: 79   Patient Position - Orthostatic VS: Sitting       Visual Acuity      ED Medications  Medications   ketorolac (TORADOL) injection 15 mg (15 mg Intramuscular Given 1/13/19 2211)       Diagnostic Studies  Results Reviewed     None                 XR ankle 3+ views LEFT   ED Interpretation by Eva Eaton PA-C (01/13 2224)   No osseous abnormality                 Procedures  Procedures       Phone Contacts  ED Phone Contact    ED Course                               MDM  Number of Diagnoses or Management Options  Sprain of anterior talofibular ligament of left ankle, initial encounter: new and requires workup  Diagnosis management comments: ddx to include but not limited to: ATFL sprain, deltoid ligament sprain, medial/lateral malleolar fracture, masoneuve fracture    SUBJECTIVE:  Abdelrahman Lott is a 29 y o  female who complains of inversion injury to the left ankle 2 hour(s) ago  Immediate symptoms: immediate pain, delayed swelling, inability to bear weight directly after injury  Symptoms have been constant since that time  Prior history of related problems: no prior problems with this area in the past  There is pain and swelling at the lateral aspect of that ankle  OBJECTIVE:  She appears well, vital signs are normal  There is swelling and tenderness over the lateral malleolus  No tenderness over the medial aspect of the ankle  The fifth metatarsal is not tender  The ankle joint is intact without excessive opening on stressing  X-ray: no fracture or dislocation noted The rest of the foot, ankle and leg exam is normal     ASSESSMENT:  Ankle  sprain    PLAN:  rest the injured area as much as practical, apply ice packs, splint dispensed and applied, crutches dispensed, see primary care physician in follow up, referral to Orthopedics for this injury, prescription for NSAID given  See orders for this visit as documented in the electronic medical record  Amount and/or Complexity of Data Reviewed  Tests in the radiology section of CPT®: ordered and reviewed  Independent visualization of images, tracings, or specimens: yes    Risk of Complications, Morbidity, and/or Mortality  Presenting problems: low  Diagnostic procedures: low  Management options: low    Patient Progress  Patient progress: stable    CritCare Time    Disposition  Final diagnoses:   Sprain of anterior talofibular ligament of left ankle, initial encounter     Time reflects when diagnosis was documented in both MDM as applicable and the Disposition within this note     Time User Action Codes Description Comment    1/13/2019 10:21 PM Cris Bishop [W10 130M] Sprain of anterior talofibular ligament of left ankle, initial encounter       ED Disposition     ED Disposition Condition Comment    Discharge  [facility] discharge to home/self care      Condition at discharge: Stable        Follow-up Information     Follow up With Specialties Details Why Contact Info Additional [address] [facility] Schedule an appointment as soon as possible for a visit in 1 week As needed Floridalma [place] [facility] [address] [place] [address], [address] [place] [facility] Emergency Medicine  If symptoms worsen [address]  [address] [facility]  [phone] AN ED, [address], [facility], [place], [address]          Patient's Medications   Discharge Prescriptions    IBUPROFEN (MOTRIN) 600 MG TABLET    Take 1 tablet (600 mg total) by mouth every 6 (six) hours as needed for mild pain       Start Date: 1/13/2019 End Date: --       Order Dose: 600 mg       Quantity: 20 tablet    Refills: 0     No discharge procedures on file      ED Provider  Electronically Signed by           Shana Reid PA-C  01/13/19 8906

## 2020-05-04 ENCOUNTER — OFFICE VISIT (OUTPATIENT)
Dept: URGENT CARE | Facility: MEDICAL CENTER | Age: 35
End: 2020-05-04
Payer: COMMERCIAL

## 2020-05-04 VITALS
WEIGHT: 293 LBS | TEMPERATURE: 99 F | DIASTOLIC BLOOD PRESSURE: 81 MMHG | RESPIRATION RATE: 18 BRPM | SYSTOLIC BLOOD PRESSURE: 143 MMHG | BODY MASS INDEX: 50.02 KG/M2 | HEART RATE: 93 BPM | HEIGHT: 64 IN

## 2020-05-04 DIAGNOSIS — J01.00 ACUTE MAXILLARY SINUSITIS, RECURRENCE NOT SPECIFIED: Primary | ICD-10-CM

## 2020-05-04 DIAGNOSIS — J02.9 SORE THROAT: ICD-10-CM

## 2020-05-04 LAB — S PYO AG THROAT QL: NEGATIVE

## 2020-05-04 PROCEDURE — 87880 STREP A ASSAY W/OPTIC: CPT | Performed by: PHYSICIAN ASSISTANT

## 2020-05-04 PROCEDURE — 99213 OFFICE O/P EST LOW 20 MIN: CPT | Performed by: PHYSICIAN ASSISTANT

## 2020-05-04 RX ORDER — AMOXICILLIN AND CLAVULANATE POTASSIUM 875; 125 MG/1; MG/1
1 TABLET, FILM COATED ORAL EVERY 12 HOURS SCHEDULED
Qty: 14 TABLET | Refills: 0 | Status: SHIPPED | OUTPATIENT
Start: 2020-05-04 | End: 2020-05-11

## 2021-03-31 ENCOUNTER — TELEPHONE (OUTPATIENT)
Dept: FAMILY MEDICINE CLINIC | Facility: CLINIC | Age: 36
End: 2021-03-31

## 2021-04-13 NOTE — TELEPHONE ENCOUNTER
04/13/21 9:30 AM     Thank you for your request  If patient has not been seen within last 3 years, 3 phone calls and a certified letter are to be sent  Once that workflow is completed, please resend PCP removal request  PCP will be removed 30 days after certified letter sent (if patient doesn't respond/ schedule with office)  This message will now be completed      Thank you  Tim Billings

## 2021-06-29 ENCOUNTER — TELEPHONE (OUTPATIENT)
Dept: FAMILY MEDICINE CLINIC | Facility: CLINIC | Age: 36
End: 2021-06-29

## 2021-06-29 NOTE — TELEPHONE ENCOUNTER
Left message for patient to call and schedule appointment or to notify us if seeing another provider

## 2021-06-29 NOTE — LETTER
Ms Jennifer Mac  [address] [facility] [address] [address] [place], [address]    Dear Skye Willingham  Records from Insurance indicate that you are a patient of Dr Leonardo Lopez MD, with [facility], [address]  Please call the office to establish care and schedule your annual physical today at [phone]  If you are seeing a primary care provider other than the one assigned to you by your insurance, you can simply call the number on the back of your insurance card to change your primary care physician with your insurance company  We thank you for choosing [address] for your healthcare needs      Sincerely,    [facility]    Certified Letter # [phone]

## 2021-08-06 ENCOUNTER — VBI (OUTPATIENT)
Dept: ADMINISTRATIVE | Facility: OTHER | Age: 36
End: 2021-08-06

## 2021-08-25 NOTE — TELEPHONE ENCOUNTER
08/25/21 10:11 AM     Thank you for your request  Your request has been received, reviewed, and the patient chart updated  The PCP has successfully been removed with a patient attribution note  This message will now be completed      Thank you  Troy Nissen

## 2022-07-15 ENCOUNTER — OFFICE VISIT (OUTPATIENT)
Dept: URGENT CARE | Age: 37
End: 2022-07-15
Payer: COMMERCIAL

## 2022-07-15 VITALS — RESPIRATION RATE: 16 BRPM | OXYGEN SATURATION: 98 % | TEMPERATURE: 98.1 F | HEART RATE: 90 BPM

## 2022-07-15 DIAGNOSIS — J02.9 SORE THROAT: Primary | ICD-10-CM

## 2022-07-15 PROCEDURE — G0382 LEV 3 HOSP TYPE B ED VISIT: HCPCS | Performed by: STUDENT IN AN ORGANIZED HEALTH CARE EDUCATION/TRAINING PROGRAM

## 2022-07-15 PROCEDURE — S9083 URGENT CARE CENTER GLOBAL: HCPCS | Performed by: STUDENT IN AN ORGANIZED HEALTH CARE EDUCATION/TRAINING PROGRAM

## 2022-07-15 RX ORDER — AZITHROMYCIN 250 MG/1
TABLET, FILM COATED ORAL
Qty: 6 TABLET | Refills: 0 | Status: SHIPPED | OUTPATIENT
Start: 2022-07-15 | End: 2022-07-19

## 2022-07-15 NOTE — PROGRESS NOTES
[facility]        NAME: Twin Gunter is a 29 y o  female  : 1985    MRN: [id number]  DATE: July 15, 2022  TIME: 9:38 AM    Assessment and Plan   Sore throat [J02 9]  1  Sore throat  azithromycin (ZITHROMAX) 250 mg tablet         Patient Instructions       Follow up with PCP in 3-5 days  Proceed to  ER if symptoms worsen  Chief Complaint     Chief Complaint   Patient presents with    Sore Throat     Started Wednesday; fever yesterday         History of Present Illness       HPI   Patient presents today complaining of sore throat that started about 3 or 4 days ago, having difficulty swallowing  Patient also had a fever yesterday, she is unsure of the exact temperature but felt very hot  Patient does not know any sick contacts this time    She is vaccinated for COVID-19    Review of Systems   Review of Systems    Per hpi   Current Medications       Current Outpatient Medications:     azithromycin (ZITHROMAX) 250 mg tablet, Take 2 tablets today then 1 tablet daily x 4 days, Disp: 6 tablet, Rfl: 0    acetaminophen (TYLENOL) 325 mg tablet, Take 2 tablets (650 mg total) by mouth every 6 (six) hours as needed for mild pain, Disp: 30 tablet, Rfl: 0    ibuprofen (MOTRIN) 400 mg tablet, Take 1 tablet (400 mg total) by mouth every 6 (six) hours as needed for mild pain Do not take on an empty stomach, Disp: , Rfl: 0    ibuprofen (MOTRIN) 600 mg tablet, Take 1 tablet (600 mg total) by mouth every 6 (six) hours as needed for mild pain, Disp: 20 tablet, Rfl: 0    Current Allergies     Allergies as of 07/15/2022 - Reviewed 07/15/2022   Allergen Reaction Noted    Amoxicillin Hives 07/15/2022    Cefaclor Hives     Cephalosporins  2017    Wheat bran - food allergy  2015            The following portions of the patient's history were reviewed and updated as appropriate: allergies, current medications, past family history, past medical history, past social history, past surgical history and problem list      Past Medical History:   Diagnosis Date    Morbid obesity with BMI of 50 0-59 9, adult ([facility] ) 5/23/2018       Past Surgical History:   Procedure Laterality Date    CHOLECYSTECTOMY  2006       Family History   Problem Relation Age of Onset    No Known Problems Mother     No Known Problems Father          Medications have been verified  Objective   Pulse 90   Temp 98 1 °F (36 7 °C) (Temporal)   Resp 16   LMP  (Within Weeks)   SpO2 98%   No LMP recorded (within weeks)  Physical Exam     Physical Exam  Constitutional:       General: She is not in acute distress  Appearance: Normal appearance  HENT:      Head: Normocephalic  Nose: No congestion or rhinorrhea  Mouth/Throat:      Mouth: Mucous membranes are moist       Pharynx: Posterior oropharyngeal erythema present  No oropharyngeal exudate  Tonsils: Tonsillar exudate present  1+ on the right  1+ on the left  Eyes:      General:         Right eye: No discharge  Left eye: No discharge  Conjunctiva/sclera: Conjunctivae normal    Cardiovascular:      Rate and Rhythm: Normal rate and regular rhythm  Pulses: Normal pulses  Pulmonary:      Effort: Pulmonary effort is normal  No respiratory distress  Abdominal:      General: Abdomen is flat  There is no distension  Palpations: Abdomen is soft  Tenderness: There is no abdominal tenderness  Musculoskeletal:      Cervical back: Neck supple  Skin:     General: Skin is warm  Capillary Refill: Capillary refill takes less than 2 seconds  Neurological:      Mental Status: She is alert and oriented to person, place, and time

## 2022-07-16 ENCOUNTER — APPOINTMENT (EMERGENCY)
Dept: CT IMAGING | Facility: HOSPITAL | Age: 37
End: 2022-07-16
Payer: COMMERCIAL

## 2022-07-16 ENCOUNTER — HOSPITAL ENCOUNTER (EMERGENCY)
Facility: HOSPITAL | Age: 37
Discharge: HOME | End: 2022-07-16
Attending: EMERGENCY MEDICINE | Admitting: EMERGENCY MEDICINE
Payer: COMMERCIAL

## 2022-07-16 VITALS
TEMPERATURE: 98.4 F | DIASTOLIC BLOOD PRESSURE: 87 MMHG | SYSTOLIC BLOOD PRESSURE: 146 MMHG | OXYGEN SATURATION: 98 % | RESPIRATION RATE: 18 BRPM | HEART RATE: 83 BPM

## 2022-07-16 DIAGNOSIS — J02.9 PHARYNGITIS, UNSPECIFIED ETIOLOGY: ICD-10-CM

## 2022-07-16 DIAGNOSIS — J03.90 ACUTE TONSILLITIS, UNSPECIFIED ETIOLOGY: Primary | ICD-10-CM

## 2022-07-16 LAB
ALBUMIN SERPL BCP-MCNC: 4.2 G/DL (ref 3.5–5)
ALP SERPL-CCNC: 53 U/L (ref 34–104)
ALT SERPL W P-5'-P-CCNC: 26 U/L (ref 7–52)
ANION GAP SERPL CALCULATED.3IONS-SCNC: 7 MMOL/L (ref 4–13)
AST SERPL W P-5'-P-CCNC: 19 U/L (ref 13–39)
BASOPHILS # BLD AUTO: 0.05 THOUSANDS/ΜL (ref 0–0.1)
BASOPHILS NFR BLD AUTO: 0 % (ref 0–1)
BILIRUB SERPL-MCNC: 0.38 MG/DL (ref 0.2–1)
BUN SERPL-MCNC: 13 MG/DL (ref 5–25)
CALCIUM SERPL-MCNC: 9.3 MG/DL (ref 8.4–10.2)
CHLORIDE SERPL-SCNC: 104 MMOL/L (ref 96–108)
CO2 SERPL-SCNC: 26 MMOL/L (ref 21–32)
CREAT SERPL-MCNC: 0.84 MG/DL (ref 0.6–1.3)
EOSINOPHIL # BLD AUTO: 0.32 THOUSAND/ΜL (ref 0–0.61)
EOSINOPHIL NFR BLD AUTO: 3 % (ref 0–6)
ERYTHROCYTE [DISTWIDTH] IN BLOOD BY AUTOMATED COUNT: 13.3 % (ref 11.6–15.1)
EXT PREG TEST URINE: NEGATIVE
EXT. CONTROL ED NAV: NORMAL
FLUAV RNA RESP QL NAA+PROBE: NEGATIVE
FLUBV RNA RESP QL NAA+PROBE: NEGATIVE
GFR SERPL CREATININE-BSD FRML MDRD: 90 ML/MIN/1.73SQ M
GLUCOSE SERPL-MCNC: 107 MG/DL (ref 65–140)
HCT VFR BLD AUTO: 42.7 % (ref 34.8–46.1)
HGB BLD-MCNC: 13.9 G/DL (ref 11.5–15.4)
IMM GRANULOCYTES # BLD AUTO: 0.12 THOUSAND/UL (ref 0–0.2)
IMM GRANULOCYTES NFR BLD AUTO: 1 % (ref 0–2)
LYMPHOCYTES # BLD AUTO: 2.03 THOUSANDS/ΜL (ref 0.6–4.47)
LYMPHOCYTES NFR BLD AUTO: 18 % (ref 14–44)
MCH RBC QN AUTO: 29.8 PG (ref 26.8–34.3)
MCHC RBC AUTO-ENTMCNC: 32.6 G/DL (ref 31.4–37.4)
MCV RBC AUTO: 91 FL (ref 82–98)
MONOCYTES # BLD AUTO: 0.93 THOUSAND/ΜL (ref 0.17–1.22)
MONOCYTES NFR BLD AUTO: 8 % (ref 4–12)
NEUTROPHILS # BLD AUTO: 8.08 THOUSANDS/ΜL (ref 1.85–7.62)
NEUTS SEG NFR BLD AUTO: 70 % (ref 43–75)
NRBC BLD AUTO-RTO: 0 /100 WBCS
PLATELET # BLD AUTO: 293 THOUSANDS/UL (ref 149–390)
PMV BLD AUTO: 10 FL (ref 8.9–12.7)
POTASSIUM SERPL-SCNC: 3.8 MMOL/L (ref 3.5–5.3)
PROT SERPL-MCNC: 7.8 G/DL (ref 6.4–8.4)
RBC # BLD AUTO: 4.67 MILLION/UL (ref 3.81–5.12)
RSV RNA RESP QL NAA+PROBE: NEGATIVE
S PYO DNA THROAT QL NAA+PROBE: NOT DETECTED
SARS-COV-2 RNA RESP QL NAA+PROBE: NEGATIVE
SODIUM SERPL-SCNC: 137 MMOL/L (ref 135–147)
WBC # BLD AUTO: 11.53 THOUSAND/UL (ref 4.31–10.16)

## 2022-07-16 PROCEDURE — 81025 URINE PREGNANCY TEST: CPT | Performed by: PHYSICIAN ASSISTANT

## 2022-07-16 PROCEDURE — G1004 CDSM NDSC: HCPCS

## 2022-07-16 PROCEDURE — 0241U HB NFCT DS VIR RESP RNA 4 TRGT: CPT | Performed by: PHYSICIAN ASSISTANT

## 2022-07-16 PROCEDURE — 85025 COMPLETE CBC W/AUTO DIFF WBC: CPT | Performed by: PHYSICIAN ASSISTANT

## 2022-07-16 PROCEDURE — 99284 EMERGENCY DEPT VISIT MOD MDM: CPT | Performed by: PHYSICIAN ASSISTANT

## 2022-07-16 PROCEDURE — 87651 STREP A DNA AMP PROBE: CPT | Performed by: PHYSICIAN ASSISTANT

## 2022-07-16 PROCEDURE — 86308 HETEROPHILE ANTIBODY SCREEN: CPT | Performed by: PHYSICIAN ASSISTANT

## 2022-07-16 PROCEDURE — 36415 COLL VENOUS BLD VENIPUNCTURE: CPT | Performed by: PHYSICIAN ASSISTANT

## 2022-07-16 PROCEDURE — 70490 CT SOFT TISSUE NECK W/O DYE: CPT

## 2022-07-16 PROCEDURE — 96374 THER/PROPH/DIAG INJ IV PUSH: CPT

## 2022-07-16 PROCEDURE — 80053 COMPREHEN METABOLIC PANEL: CPT | Performed by: PHYSICIAN ASSISTANT

## 2022-07-16 PROCEDURE — 99284 EMERGENCY DEPT VISIT MOD MDM: CPT

## 2022-07-16 RX ORDER — CLINDAMYCIN HYDROCHLORIDE 150 MG/1
300 CAPSULE ORAL ONCE
Status: COMPLETED | OUTPATIENT
Start: 2022-07-16 | End: 2022-07-16

## 2022-07-16 RX ORDER — CLINDAMYCIN HYDROCHLORIDE 300 MG/1
300 CAPSULE ORAL 4 TIMES DAILY
Qty: 28 CAPSULE | Refills: 0 | Status: SHIPPED | OUTPATIENT
Start: 2022-07-16 | End: 2022-07-23

## 2022-07-16 RX ORDER — LIDOCAINE HYDROCHLORIDE 20 MG/ML
15 SOLUTION OROPHARYNGEAL ONCE
Status: COMPLETED | OUTPATIENT
Start: 2022-07-16 | End: 2022-07-16

## 2022-07-16 RX ORDER — DEXAMETHASONE SODIUM PHOSPHATE 4 MG/ML
10 INJECTION, SOLUTION INTRA-ARTICULAR; INTRALESIONAL; INTRAMUSCULAR; INTRAVENOUS; SOFT TISSUE ONCE
Status: COMPLETED | OUTPATIENT
Start: 2022-07-16 | End: 2022-07-16

## 2022-07-16 RX ADMIN — DEXAMETHASONE SODIUM PHOSPHATE 10 MG: 4 INJECTION, SOLUTION INTRAMUSCULAR; INTRAVENOUS at 06:13

## 2022-07-16 RX ADMIN — CLINDAMYCIN HYDROCHLORIDE 300 MG: 150 CAPSULE ORAL at 07:57

## 2022-07-16 RX ADMIN — LIDOCAINE HYDROCHLORIDE 15 ML: 20 SOLUTION ORAL; TOPICAL at 06:20

## 2022-07-16 RX ADMIN — TOPICAL ANESTHETIC 2 SPRAY: 200 SPRAY DENTAL; PERIODONTAL at 06:19

## 2022-07-16 RX ADMIN — IBUPROFEN 600 MG: 100 SUSPENSION ORAL at 06:19

## 2022-07-16 NOTE — ED PROVIDER NOTES
History  Chief Complaint   Patient presents with    Sore Throat     Sore throat started Wednesday, fever with chills on Thursday  Went to urgent care and sent her home with Zithromax but did not test for strep  States the sore throat and swelling in throat is worse     Patient is a 29 YOF presenting to the ER for evaluation  Patient states she developed a sore throat on Wednesday evening  She states it is getting progressively worse since then  She had fever and chills on Thursday which resolved  She went to the urgent care yesterday and was started on a z-jeniffer without much improvement  She denies any other symptoms at this time  History provided by:  Patient   used: No        Prior to Admission Medications   Prescriptions Last Dose Informant Patient Reported? Taking?   acetaminophen (TYLENOL) 325 mg tablet   No No   Sig: Take 2 tablets (650 mg total) by mouth every 6 (six) hours as needed for mild pain   azithromycin (ZITHROMAX) 250 mg tablet   No No   Sig: Take 2 tablets today then 1 tablet daily x 4 days   ibuprofen (MOTRIN) 400 mg tablet   No No   Sig: Take 1 tablet (400 mg total) by mouth every 6 (six) hours as needed for mild pain Do not take on an empty stomach   ibuprofen (MOTRIN) 600 mg tablet   No No   Sig: Take 1 tablet (600 mg total) by mouth every 6 (six) hours as needed for mild pain      Facility-Administered Medications: None       Past Medical History:   Diagnosis Date    Morbid obesity with BMI of 50 0-59 9, adult ([facility] ) 5/23/2018       Past Surgical History:   Procedure Laterality Date    CHOLECYSTECTOMY  2006       Family History   Problem Relation Age of Onset    No Known Problems Mother     No Known Problems Father      I have reviewed and agree with the history as documented      E-Cigarette/Vaping     E-Cigarette/Vaping Substances     Social History     Tobacco Use    Smoking status: Never Smoker    Smokeless tobacco: Never Used   Substance Use Topics    Alcohol use: No     Comment: occasional    Drug use: No       Review of Systems   Constitutional: Positive for chills and fever  HENT: Positive for sore throat  Negative for ear pain  Eyes: Negative for pain and visual disturbance  Respiratory: Negative for cough and shortness of breath  Cardiovascular: Negative for chest pain and palpitations  Gastrointestinal: Negative for abdominal pain and vomiting  Genitourinary: Negative for dysuria and hematuria  Musculoskeletal: Negative for arthralgias and back pain  Skin: Negative for color change and rash  Neurological: Negative for seizures and syncope  All other systems reviewed and are negative  Physical Exam  Physical Exam  Vitals and nursing note reviewed  Constitutional:       General: She is not in acute distress  Appearance: She is well-developed  HENT:      Head: Normocephalic and atraumatic  Mouth/Throat:      Pharynx: Oropharyngeal exudate and posterior oropharyngeal erythema present  Tonsils: 3+ on the right  3+ on the left  Comments: Patient speaking in full clear sentences  Airway intact     Eyes:      Conjunctiva/sclera: Conjunctivae normal    Cardiovascular:      Rate and Rhythm: Normal rate and regular rhythm  Heart sounds: No murmur heard  Pulmonary:      Effort: Pulmonary effort is normal  No respiratory distress  Breath sounds: Normal breath sounds  Abdominal:      Palpations: Abdomen is soft  Tenderness: There is no abdominal tenderness  Musculoskeletal:      Cervical back: Neck supple  Skin:     General: Skin is warm and dry  Neurological:      Mental Status: She is alert           Vital Signs  ED Triage Vitals   Temperature Pulse Respirations Blood Pressure SpO2   07/16/22 0545 07/16/22 0545 07/16/22 0545 07/16/22 0545 07/16/22 0545   98 4 °F (36 9 °C) (!) 109 20 (!) 188/100 97 %      Temp Source Heart Rate Source Patient Position - Orthostatic VS BP Location FiO2 (%)   07/16/22 0545 07/16/22 0545 07/16/22 0650 07/16/22 0650 --   Oral Monitor Sitting Right arm       Pain Score       07/16/22 0619       8           Vitals:    07/16/22 0545 07/16/22 0647 07/16/22 0650   BP: (!) 188/100  (!) 163/106   Pulse: (!) 109 88 85   Patient Position - Orthostatic VS:   Sitting         ED Medications  Medications   clindamycin (CLEOCIN) capsule 300 mg (has no administration in time range)   benzocaine (HURRICAINE) 20 % mucosal spray 2 spray (2 sprays Mucosal Given 7/16/22 0619)   dexamethasone (DECADRON) injection 10 mg (10 mg Intravenous Given 7/16/22 6338)   Lidocaine Viscous HCl (XYLOCAINE) 2 % mucosal solution 15 mL (15 mL Swish & Swallow Given 7/16/22 4387)   ibuprofen (MOTRIN) oral suspension 600 mg (600 mg Oral Given 7/16/22 0070)       Diagnostic Studies  Results Reviewed     Procedure Component Value Units Date/Time    COVID/FLU/RSV [[id number]]  (Normal) Collected: 07/16/22 0617    Lab Status: Final result Specimen: Nares from Nose Updated: 07/16/22 0707     SARS-CoV-2 Negative     INFLUENZA A PCR Negative     INFLUENZA B PCR Negative     RSV PCR Negative    Narrative:      FOR PEDIATRIC PATIENTS - copy/paste COVID Guidelines URL to browser: [email or web address]    SARS-CoV-2 assay is a Nucleic Acid Amplification assay intended for the  qualitative detection of nucleic acid from SARS-CoV-2 in nasopharyngeal  swabs  Results are for the presumptive identification of SARS-CoV-2 RNA  Positive results are indicative of infection with SARS-CoV-2, the virus  causing COVID-19, but do not rule out bacterial infection or co-infection  with other viruses  Laboratories within the [place] and its  territories are required to report all positive results to the appropriate  public health authorities  Negative results do not preclude SARS-CoV-2  infection and should not be used as the sole basis for treatment or other  patient management decisions  Negative results must be combined with  clinical observations, patient history, and epidemiological information  This test has not been FDA cleared or approved  This test has been authorized by FDA under an Emergency Use Authorization  (EUA)  This test is only authorized for the duration of time the  declaration that circumstances exist justifying the authorization of the  emergency use of an in vitro diagnostic tests for detection of SARS-CoV-2  virus and/or diagnosis of COVID-19 infection under section 564(b)(1) of  the Act, 21 U  S C  381QBC-1(Y)(7), unless the authorization is terminated  or revoked sooner  The test has been validated but independent review by FDA  and CLIA is pending  Test performed using [employer] GeneXpert: This RT-PCR assay targets N2,  a region unique to SARS-CoV-2  A conserved region in the E-gene was chosen  for pan-Sarbecovirus detection which includes SARS-CoV-2      Strep A PCR [[id number]]  (Normal) Collected: 07/16/22 0617    Lab Status: Final result Specimen: Throat Updated: 07/16/22 0655     STREP A PCR Not Detected    Comprehensive metabolic panel [[id number]] Collected: 07/16/22 0617    Lab Status: Final result Specimen: Blood from Arm, Right Updated: 07/16/22 5708     Sodium 137 mmol/L      Potassium 3 8 mmol/L      Chloride 104 mmol/L      CO2 26 mmol/L      ANION GAP 7 mmol/L      BUN 13 mg/dL      Creatinine 0 84 mg/dL      Glucose 107 mg/dL      Calcium 9 3 mg/dL      AST 19 U/L      ALT 26 U/L      Alkaline Phosphatase 53 U/L      Total Protein 7 8 g/dL      Albumin 4 2 g/dL      Total Bilirubin 0 38 mg/dL      eGFR 90 ml/min/1 73sq m     Narrative:      Ronnie guidelines for Chronic Kidney Disease (CKD):     Stage 1 with normal or high GFR (GFR > 90 mL/min/1 73 square meters)    Stage 2 Mild CKD (GFR = 60-89 mL/min/1 73 square meters)    Stage 3A Moderate CKD (GFR = 45-59 mL/min/1 73 square meters)    Stage 3B Moderate CKD (GFR = 30-44 mL/min/1 73 square meters)    Stage 4 Severe CKD (GFR = 15-29 mL/min/1 73 square meters)    Stage 5 End Stage CKD (GFR <15 mL/min/1 73 square meters)  Note: GFR calculation is accurate only with a steady state creatinine    CBC and differential [[id number]]  (Abnormal) Collected: 07/16/22 0617    Lab Status: Final result Specimen: Blood from Arm, Right Updated: 07/16/22 0634     WBC 11 53 Thousand/uL      RBC 4 67 Million/uL      Hemoglobin 13 9 g/dL      Hematocrit 42 7 %      MCV 91 fL      MCH 29 8 pg      MCHC 32 6 g/dL      RDW 13 3 %      MPV 10 0 fL      Platelets 628 Thousands/uL      nRBC 0 /100 WBCs      Neutrophils Relative 70 %      Immat GRANS % 1 %      Lymphocytes Relative 18 %      Monocytes Relative 8 %      Eosinophils Relative 3 %      Basophils Relative 0 %      Neutrophils Absolute 8 08 Thousands/µL      Immature Grans Absolute 0 12 Thousand/uL      Lymphocytes Absolute 2 03 Thousands/µL      Monocytes Absolute 0 93 Thousand/µL      Eosinophils Absolute 0 32 Thousand/µL      Basophils Absolute 0 05 Thousands/µL     POCT pregnancy, urine [[id number]]  (Normal) Resulted: 07/16/22 0630    Lab Status: Final result Updated: 07/16/22 0630     EXT PREG TEST UR (Ref: Negative) Negative     Control Valid    Mononucleosis screen [[id number]] Collected: 07/16/22 0617    Lab Status: In process Specimen: Blood from Arm, Right Updated: 07/16/22 7636                 CT soft tissue neck wo contrast   Final Result by Roel Trejo DO (07/16 4787)      Mild prominence of the palatine tonsils and nasopharyngeal mucosa, bilaterally symmetric without a discrete mass may represent mild pharyngitis  Mild reactive adenopathy within the jugulodigastric nodes                 Workstation performed: [id number]                      ED Course  ED Course as of 07/16/22 0755   Sat Jul 16, 2022   7637 Per Radiology, there is a national shortage of IV contrast, all CT scans are to be ordered without contrast at this time unless otherwise indicated by radiologist for emergent situations including stroke alerts, rule out aortic dissection, trauma alerts, or high suspicion for pulmonary embolism  Will order CT without contrast at this time  5450 Re-evaluation at bedside, patient feels better, would like to go home  Updated patient on labs and imaging results  Counseling as below  The appropriate recommended follow up and warning signs for return to the nearest ED were explained  patient's questions answered; patient is competent and reliable, verbalized understanding of all that was explained, and agrees with the disposition and further plan of treatment  patient was additionally provided with detailed follow up care instructions, phone numbers to call within our institution for other concerns or inquiries  Advised to follow up with PCP and ENT  Pt is in no acute distress and is stable for discharge at this time  MDM  Number of Diagnoses or Management Options  Acute tonsillitis, unspecified etiology: new and requires workup  Pharyngitis, unspecified etiology: new and requires workup     Amount and/or Complexity of Data Reviewed  Clinical lab tests: ordered and reviewed  Tests in the radiology section of CPT®: ordered and reviewed    Patient Progress  Patient progress: stable      Disposition  Final diagnoses:   Acute tonsillitis, unspecified etiology   Pharyngitis, unspecified etiology     Time reflects when diagnosis was documented in both MDM as applicable and the Disposition within this note     Time User Action Codes Description Comment    7/16/2022  7:06 AM Jestine Day Add [J03 90] Acute tonsillitis, unspecified etiology     7/16/2022  7:06 AM Jestine Day Add [J02 9] Pharyngitis, unspecified etiology       ED Disposition     ED Disposition   Discharge    Condition   Stable    Date/Time   Sat Jul 16, 2022  7:48 AM    Comment   Christa Romero discharge to home/self care                 Follow-up Information     Follow up With Specialties Details Why Contact Info Additional [address] [facility] Emergency Department Emergency Medicine Schedule an appointment as soon as possible for a visit   [address] [facility] [address] Emergency Department, [address], [place], [place], [address]    Lawson Bowers MD Otolaryngology Schedule an appointment as soon as possible for a visit   [address] [facility]  [address] [facility] [phone]       [facility] Schedule an appointment as soon as possible for a visit   [address] [phone]  [address] [facility] , [place], [place], [address]          Patient's Medications   Discharge Prescriptions    CLINDAMYCIN (CLEOCIN) 300 MG CAPSULE    Take 1 capsule (300 mg total) by mouth 4 (four) times a day for 7 days       Start Date: 7/16/2022 End Date: 7/23/2022       Order Dose: 300 mg       Quantity: 28 capsule    Refills: 0       No discharge procedures on file      PDMP Review       Value Time User    PDMP Reviewed  Yes 7/16/2022  5:43 AM Koki Cotter MD          ED Provider  Electronically Signed by           Andrés Weaver PA-C  07/16/22 0696

## 2022-07-16 NOTE — DISCHARGE INSTRUCTIONS
Tylenol / motrin for pain  Salt water gargles  Warm fluids  Take antibiotics as directed  RETURN TO THE ER IMMEDIATELY IF SYMPTOMS WORSEN  Follow up with PCP  Follow up with ENT

## 2022-07-16 NOTE — ED ATTENDING ATTESTATION
7/16/2022  I, Seda Foote MD, saw and evaluated the patient  I have discussed the patient with the resident/non-physician practitioner and agree with the resident's/non-physician practitioner's findings, Plan of Care, and MDM as documented in the resident's/non-physician practitioner's note, except where noted  All available labs and Radiology studies were reviewed  I was present for key portions of any procedure(s) performed by the resident/non-physician practitioner and I was immediately available to provide assistance  At this point I agree with the current assessment done in the Emergency Department  I have conducted an independent evaluation of this patient a history and physical is as follows: Patient is a 29year old female with worsening sore throat for past several days and was placed on zithromax without relief  Was last seen in this ED on 1/13/19 for left ankle sprain  [facility] and [facility] found  NCAT  (+) tonsillar enlargement with erythema and mild uvular edema  No respiratory distress  Lungs clear  Heart regular  Neck supple  No rash noted  DDx including but not limited to: pharyngitis, mononucleosis, viral illness; doubt epiglottitis, peritonsillar abscess, retropharyngeal abscess  IV decadron given  Will give clindamycin  I reviewed labs and CT result       ED Course         Critical Care Time  Procedures

## 2022-07-17 LAB — HETEROPH AB SER QL: NEGATIVE

## 2023-07-05 ENCOUNTER — APPOINTMENT (EMERGENCY)
Dept: CT IMAGING | Facility: HOSPITAL | Age: 38
End: 2023-07-05
Payer: COMMERCIAL

## 2023-07-05 ENCOUNTER — HOSPITAL ENCOUNTER (EMERGENCY)
Facility: HOSPITAL | Age: 38
Discharge: HOME | End: 2023-07-05
Attending: EMERGENCY MEDICINE
Payer: COMMERCIAL

## 2023-07-05 VITALS
RESPIRATION RATE: 18 BRPM | TEMPERATURE: 98.3 F | DIASTOLIC BLOOD PRESSURE: 67 MMHG | HEART RATE: 80 BPM | SYSTOLIC BLOOD PRESSURE: 142 MMHG | OXYGEN SATURATION: 99 %

## 2023-07-05 DIAGNOSIS — R51.9 ACUTE HEADACHE: Primary | ICD-10-CM

## 2023-07-05 DIAGNOSIS — I10 HYPERTENSION: ICD-10-CM

## 2023-07-05 LAB
ALBUMIN SERPL BCP-MCNC: 4.4 G/DL (ref 3.5–5)
ALP SERPL-CCNC: 53 U/L (ref 34–104)
ALT SERPL W P-5'-P-CCNC: 29 U/L (ref 7–52)
ANION GAP SERPL CALCULATED.3IONS-SCNC: 9 MMOL/L
APTT PPP: 30 SECONDS (ref 23–37)
AST SERPL W P-5'-P-CCNC: 25 U/L (ref 13–39)
BACTERIA UR QL AUTO: NORMAL /HPF
BASOPHILS # BLD AUTO: 0.04 THOUSANDS/ÂΜL (ref 0–0.1)
BASOPHILS NFR BLD AUTO: 1 % (ref 0–1)
BILIRUB SERPL-MCNC: 0.53 MG/DL (ref 0.2–1)
BILIRUB UR QL STRIP: NEGATIVE
BUN SERPL-MCNC: 11 MG/DL (ref 5–25)
CALCIUM SERPL-MCNC: 9.4 MG/DL (ref 8.4–10.2)
CHLORIDE SERPL-SCNC: 102 MMOL/L (ref 96–108)
CLARITY UR: CLEAR
CO2 SERPL-SCNC: 26 MMOL/L (ref 21–32)
COLOR UR: ABNORMAL
CREAT SERPL-MCNC: 0.7 MG/DL (ref 0.6–1.3)
EOSINOPHIL # BLD AUTO: 0.14 THOUSAND/ÂΜL (ref 0–0.61)
EOSINOPHIL NFR BLD AUTO: 2 % (ref 0–6)
ERYTHROCYTE [DISTWIDTH] IN BLOOD BY AUTOMATED COUNT: 13.3 % (ref 11.6–15.1)
EXT PREGNANCY TEST URINE: NEGATIVE
EXT. CONTROL: NORMAL
GFR SERPL CREATININE-BSD FRML MDRD: 111 ML/MIN/1.73SQ M
GLUCOSE SERPL-MCNC: 88 MG/DL (ref 65–140)
GLUCOSE UR STRIP-MCNC: NEGATIVE MG/DL
HCT VFR BLD AUTO: 42.9 % (ref 34.8–46.1)
HGB BLD-MCNC: 13.5 G/DL (ref 11.5–15.4)
HGB UR QL STRIP.AUTO: ABNORMAL
IMM GRANULOCYTES # BLD AUTO: 0.04 THOUSAND/UL (ref 0–0.2)
IMM GRANULOCYTES NFR BLD AUTO: 1 % (ref 0–2)
INR PPP: 0.99 (ref 0.84–1.19)
KETONES UR STRIP-MCNC: NEGATIVE MG/DL
LEUKOCYTE ESTERASE UR QL STRIP: NEGATIVE
LYMPHOCYTES # BLD AUTO: 1.96 THOUSANDS/ÂΜL (ref 0.6–4.47)
LYMPHOCYTES NFR BLD AUTO: 27 % (ref 14–44)
MCH RBC QN AUTO: 28.1 PG (ref 26.8–34.3)
MCHC RBC AUTO-ENTMCNC: 31.5 G/DL (ref 31.4–37.4)
MCV RBC AUTO: 89 FL (ref 82–98)
MONOCYTES # BLD AUTO: 0.47 THOUSAND/ÂΜL (ref 0.17–1.22)
MONOCYTES NFR BLD AUTO: 7 % (ref 4–12)
NEUTROPHILS # BLD AUTO: 4.58 THOUSANDS/ÂΜL (ref 1.85–7.62)
NEUTS SEG NFR BLD AUTO: 62 % (ref 43–75)
NITRITE UR QL STRIP: NEGATIVE
NON-SQ EPI CELLS URNS QL MICRO: NORMAL /HPF
NRBC BLD AUTO-RTO: 0 /100 WBCS
PH UR STRIP.AUTO: 5.5 [PH]
PLATELET # BLD AUTO: 316 THOUSANDS/UL (ref 149–390)
PMV BLD AUTO: 9.8 FL (ref 8.9–12.7)
POTASSIUM SERPL-SCNC: 3.7 MMOL/L (ref 3.5–5.3)
PROT SERPL-MCNC: 7.8 G/DL (ref 6.4–8.4)
PROT UR STRIP-MCNC: NEGATIVE MG/DL
PROTHROMBIN TIME: 13.3 SECONDS (ref 11.6–14.5)
RBC # BLD AUTO: 4.81 MILLION/UL (ref 3.81–5.12)
RBC #/AREA URNS AUTO: NORMAL /HPF
SODIUM SERPL-SCNC: 137 MMOL/L (ref 135–147)
SP GR UR STRIP.AUTO: 1.01 (ref 1–1.03)
UROBILINOGEN UR STRIP-ACNC: <2 MG/DL
WBC # BLD AUTO: 7.23 THOUSAND/UL (ref 4.31–10.16)
WBC #/AREA URNS AUTO: NORMAL /HPF

## 2023-07-05 PROCEDURE — 99285 EMERGENCY DEPT VISIT HI MDM: CPT | Performed by: EMERGENCY MEDICINE

## 2023-07-05 PROCEDURE — 93005 ELECTROCARDIOGRAM TRACING: CPT

## 2023-07-05 PROCEDURE — 36415 COLL VENOUS BLD VENIPUNCTURE: CPT | Performed by: EMERGENCY MEDICINE

## 2023-07-05 PROCEDURE — 96375 TX/PRO/DX INJ NEW DRUG ADDON: CPT

## 2023-07-05 PROCEDURE — 80053 COMPREHEN METABOLIC PANEL: CPT | Performed by: EMERGENCY MEDICINE

## 2023-07-05 PROCEDURE — 85610 PROTHROMBIN TIME: CPT | Performed by: EMERGENCY MEDICINE

## 2023-07-05 PROCEDURE — 99284 EMERGENCY DEPT VISIT MOD MDM: CPT

## 2023-07-05 PROCEDURE — 81001 URINALYSIS AUTO W/SCOPE: CPT | Performed by: EMERGENCY MEDICINE

## 2023-07-05 PROCEDURE — G1004 CDSM NDSC: HCPCS

## 2023-07-05 PROCEDURE — 81025 URINE PREGNANCY TEST: CPT | Performed by: EMERGENCY MEDICINE

## 2023-07-05 PROCEDURE — 70450 CT HEAD/BRAIN W/O DYE: CPT

## 2023-07-05 PROCEDURE — 96374 THER/PROPH/DIAG INJ IV PUSH: CPT

## 2023-07-05 PROCEDURE — 85730 THROMBOPLASTIN TIME PARTIAL: CPT | Performed by: EMERGENCY MEDICINE

## 2023-07-05 PROCEDURE — 85025 COMPLETE CBC W/AUTO DIFF WBC: CPT | Performed by: EMERGENCY MEDICINE

## 2023-07-05 RX ORDER — LABETALOL HYDROCHLORIDE 5 MG/ML
10 INJECTION, SOLUTION INTRAVENOUS ONCE
Status: DISCONTINUED | OUTPATIENT
Start: 2023-07-05 | End: 2023-07-05

## 2023-07-05 RX ORDER — LISINOPRIL 5 MG/1
5 TABLET ORAL DAILY
Qty: 30 TABLET | Refills: 1 | Status: SHIPPED | OUTPATIENT
Start: 2023-07-05 | End: 2023-07-10

## 2023-07-05 RX ORDER — KETOROLAC TROMETHAMINE 30 MG/ML
15 INJECTION, SOLUTION INTRAMUSCULAR; INTRAVENOUS ONCE
Status: COMPLETED | OUTPATIENT
Start: 2023-07-05 | End: 2023-07-05

## 2023-07-05 RX ORDER — DIPHENHYDRAMINE HYDROCHLORIDE 50 MG/ML
25 INJECTION INTRAMUSCULAR; INTRAVENOUS ONCE
Status: COMPLETED | OUTPATIENT
Start: 2023-07-05 | End: 2023-07-05

## 2023-07-05 RX ORDER — METOCLOPRAMIDE HYDROCHLORIDE 5 MG/ML
10 INJECTION INTRAMUSCULAR; INTRAVENOUS ONCE
Status: COMPLETED | OUTPATIENT
Start: 2023-07-05 | End: 2023-07-05

## 2023-07-05 RX ORDER — BUTALBITAL, ACETAMINOPHEN AND CAFFEINE 50; 325; 40 MG/1; MG/1; MG/1
1 TABLET ORAL EVERY 4 HOURS PRN
Qty: 20 TABLET | Refills: 0 | Status: SHIPPED | OUTPATIENT
Start: 2023-07-05 | End: 2023-07-15

## 2023-07-05 RX ADMIN — DIPHENHYDRAMINE HYDROCHLORIDE 25 MG: 50 INJECTION, SOLUTION INTRAMUSCULAR; INTRAVENOUS at 15:58

## 2023-07-05 RX ADMIN — METOCLOPRAMIDE 10 MG: 5 INJECTION, SOLUTION INTRAMUSCULAR; INTRAVENOUS at 15:56

## 2023-07-05 RX ADMIN — KETOROLAC TROMETHAMINE 15 MG: 30 INJECTION, SOLUTION INTRAMUSCULAR at 15:57

## 2023-07-05 NOTE — ED PROVIDER NOTES
History  Chief Complaint   Patient presents with   • Headache     Pt presents to the ED with c/o waking up severe headache, dizziness that started today. Pt reports difficulty with eyes focusing on objects, took BP at home and reported it was high. 40year old female presents to the ED for evaluation of headache, elevated blood pressure, and lightheadedness with nausea. Patient woke this am with headache, reports it as 7/10 pain, unrelieved with tylenol. 170/110 at home this am              Prior to Admission Medications   Prescriptions Last Dose Informant Patient Reported? Taking?   acetaminophen (TYLENOL) 325 mg tablet   No No   Sig: Take 2 tablets (650 mg total) by mouth every 6 (six) hours as needed for mild pain   ibuprofen (MOTRIN) 400 mg tablet   No No   Sig: Take 1 tablet (400 mg total) by mouth every 6 (six) hours as needed for mild pain Do not take on an empty stomach   ibuprofen (MOTRIN) 600 mg tablet   No No   Sig: Take 1 tablet (600 mg total) by mouth every 6 (six) hours as needed for mild pain      Facility-Administered Medications: None       Past Medical History:   Diagnosis Date   • Morbid obesity with BMI of 50.0-59.9, adult ([address] [facility]) 5/23/2018       Past Surgical History:   Procedure Laterality Date   • CHOLECYSTECTOMY  2006       Family History   Problem Relation Age of Onset   • No Known Problems Mother    • No Known Problems Father      I have reviewed and agree with the history as documented.     E-Cigarette/Vaping     E-Cigarette/Vaping Substances     Social History     Tobacco Use   • Smoking status: Never   • Smokeless tobacco: Never   Substance Use Topics   • Alcohol use: No     Comment: occasional   • Drug use: No       Review of Systems    Physical Exam  Physical Exam    Vital Signs  ED Triage Vitals [07/05/23 1419]   Temperature Pulse Respirations Blood Pressure SpO2   98.3 °F (36.8 °C) 96 16 (S) (!) 188/117 98 %      Temp Source Heart Rate Source Patient Position - Orthostatic VS BP Location FiO2 (%)   Oral Monitor -- Right arm --      Pain Score       7           Vitals:    07/05/23 1419   BP: (S) (!) 188/117   Pulse: 96         Visual Acuity      ED Medications  Medications - No data to display    Diagnostic Studies  Results Reviewed     Procedure Component Value Units Date/Time    POCT pregnancy, urine [[id number]]  (Normal) Resulted: 07/05/23 1536    Lab Status: Final result Updated: 07/05/23 1536     EXT Preg Test, Ur Negative     Control Valid    UA w Reflex to Microscopic w Reflex to Culture [[id number]] Collected: 07/05/23 1535    Lab Status: No result Specimen: Urine, Clean Catch     CBC and differential [[id number]] Collected: 07/05/23 1515    Lab Status: Final result Specimen: Blood from Arm, Right Updated: 07/05/23 1531     WBC 7.23 Thousand/uL      RBC 4.81 Million/uL      Hemoglobin 13.5 g/dL      Hematocrit 42.9 %      MCV 89 fL      MCH 28.1 pg      MCHC 31.5 g/dL      RDW 13.3 %      MPV 9.8 fL      Platelets 059 Thousands/uL      nRBC 0 /100 WBCs      Neutrophils Relative 62 %      Immat GRANS % 1 %      Lymphocytes Relative 27 %      Monocytes Relative 7 %      Eosinophils Relative 2 %      Basophils Relative 1 %      Neutrophils Absolute 4.58 Thousands/µL      Immature Grans Absolute 0.04 Thousand/uL      Lymphocytes Absolute 1.96 Thousands/µL      Monocytes Absolute 0.47 Thousand/µL      Eosinophils Absolute 0.14 Thousand/µL      Basophils Absolute 0.04 Thousands/µL     Protime-INR [[id number]] Collected: 07/05/23 1515    Lab Status: In process Specimen: Blood from Arm, Right Updated: 07/05/23 1524    APTT [[id number]] Collected: 07/05/23 1515    Lab Status: In process Specimen: Blood from Arm, Right Updated: 07/05/23 1524    Comprehensive metabolic panel [[id number]] Collected: 07/05/23 1515    Lab Status:  In process Specimen: Blood from Arm, Right Updated: 07/05/23 1524                 CT head without contrast    (Results Pending)              Procedures  ECG 12 Lead Documentation Only    Date/Time: 7/5/2023 3:37 PM    Performed by: Giovanna Medina DO  Authorized by: Giovanna Medina DO    Indications / Diagnosis:  Headache, HTN  ECG reviewed by me, the ED Provider: yes    Patient location:  ED  Previous ECG:     Previous ECG:  Unavailable  Interpretation:     Interpretation: normal    Rate:     ECG rate:  75    ECG rate assessment: normal    Rhythm:     Rhythm: sinus rhythm    Ectopy:     Ectopy: none    QRS:     QRS axis:  Normal  Conduction:     Conduction: normal    ST segments:     ST segments:  Normal  T waves:     T waves: normal               ED Course                                             MDM    Disposition  Final diagnoses:   None     ED Disposition     None      Follow-up Information    None         Patient's Medications   Discharge Prescriptions    No medications on file       No discharge procedures on file.     PDMP Review       Value Time User    PDMP Reviewed  Yes 7/16/2022  5:43 AM Blaine Amaya MD          ED Provider  Electronically Signed by normal.      Deep Tendon Reflexes: Reflexes are normal and symmetric. Psychiatric:         Mood and Affect: Mood is anxious. Affect is tearful. Behavior: Behavior normal.         Thought Content:  Thought content normal.         Judgment: Judgment normal.         Vital Signs  ED Triage Vitals   Temperature Pulse Respirations Blood Pressure SpO2   07/05/23 1419 07/05/23 1419 07/05/23 1419 07/05/23 1419 07/05/23 1419   98.3 °F (36.8 °C) 96 16 (S) (!) 188/117 98 %      Temp Source Heart Rate Source Patient Position - Orthostatic VS BP Location FiO2 (%)   07/05/23 1419 07/05/23 1419 07/05/23 1621 07/05/23 1419 --   Oral Monitor Lying Right arm       Pain Score       07/05/23 1419       7           Vitals:    07/05/23 1419 07/05/23 1543 07/05/23 1621   BP: (S) (!) 188/117 165/81 142/67   Pulse: 96  80   Patient Position - Orthostatic VS:   Lying         Visual Acuity      ED Medications  Medications   ketorolac (TORADOL) injection 15 mg (15 mg Intravenous Given 7/5/23 1557)   diphenhydrAMINE (BENADRYL) injection 25 mg (25 mg Intravenous Given 7/5/23 1558)   metoclopramide (REGLAN) injection 10 mg (10 mg Intravenous Given 7/5/23 1556)       Diagnostic Studies  Results Reviewed     Procedure Component Value Units Date/Time    Protime-INR [[id number]]  (Normal) Collected: 07/05/23 1515    Lab Status: Final result Specimen: Blood from Arm, Right Updated: 07/05/23 1729     Protime 13.3 seconds      INR 0.99    APTT [[id number]]  (Normal) Collected: 07/05/23 1515    Lab Status: Final result Specimen: Blood from Arm, Right Updated: 07/05/23 1729     PTT 30 seconds     Urine Microscopic [[id number]]  (Normal) Collected: 07/05/23 1535    Lab Status: Final result Specimen: Urine, Clean Catch Updated: 07/05/23 1607     RBC, UA 1-2 /hpf      WBC, UA None Seen /hpf      Epithelial Cells Occasional /hpf      Bacteria, UA Occasional /hpf     UA w Reflex to Microscopic w Reflex to Culture [[id number]]  (Abnormal) Collected: 07/05/23 1535    Lab Status: Final result Specimen: Urine, Clean Catch Updated: 07/05/23 1601     Color, UA Light Yellow     Clarity, UA Clear     Specific Gravity, UA 1.010     pH, UA 5.5     Leukocytes, UA Negative     Nitrite, UA Negative     Protein, UA Negative mg/dl      Glucose, UA Negative mg/dl      Ketones, UA Negative mg/dl      Urobilinogen, UA <2.0 mg/dl      Bilirubin, UA Negative     Occult Blood, UA Small    Comprehensive metabolic panel [[id number]] Collected: 07/05/23 1515    Lab Status: Final result Specimen: Blood from Arm, Right Updated: 07/05/23 1550     Sodium 137 mmol/L      Potassium 3.7 mmol/L      Chloride 102 mmol/L      CO2 26 mmol/L      ANION GAP 9 mmol/L      BUN 11 mg/dL      Creatinine 0.70 mg/dL      Glucose 88 mg/dL      Calcium 9.4 mg/dL      AST 25 U/L      ALT 29 U/L      Alkaline Phosphatase 53 U/L      Total Protein 7.8 g/dL      Albumin 4.4 g/dL      Total Bilirubin 0.53 mg/dL      eGFR 111 ml/min/1.73sq m     Narrative:      [place] guidelines for Chronic Kidney Disease (CKD):   •  Stage 1 with normal or high GFR (GFR > 90 mL/min/1.73 square meters)  •  Stage 2 Mild CKD (GFR = 60-89 mL/min/1.73 square meters)  •  Stage 3A Moderate CKD (GFR = 45-59 mL/min/1.73 square meters)  •  Stage 3B Moderate CKD (GFR = 30-44 mL/min/1.73 square meters)  •  Stage 4 Severe CKD (GFR = 15-29 mL/min/1.73 square meters)  •  Stage 5 End Stage CKD (GFR <15 mL/min/1.73 square meters)  Note: GFR calculation is accurate only with a steady state creatinine    POCT pregnancy, urine [[id number]]  (Normal) Resulted: 07/05/23 1536    Lab Status: Final result Updated: 07/05/23 1536     EXT Preg Test, Ur Negative     Control Valid    CBC and differential [[id number]] Collected: 07/05/23 1515    Lab Status: Final result Specimen: Blood from Arm, Right Updated: 07/05/23 1531     WBC 7.23 Thousand/uL      RBC 4.81 Million/uL      Hemoglobin 13.5 g/dL      Hematocrit 42.9 %      MCV 89 fL      MCH 28.1 pg      MCHC 31.5 g/dL      RDW 13.3 %      MPV 9.8 fL      Platelets 724 Thousands/uL      nRBC 0 /100 WBCs      Neutrophils Relative 62 %      Immat GRANS % 1 %      Lymphocytes Relative 27 %      Monocytes Relative 7 %      Eosinophils Relative 2 %      Basophils Relative 1 %      Neutrophils Absolute 4.58 Thousands/µL      Immature Grans Absolute 0.04 Thousand/uL      Lymphocytes Absolute 1.96 Thousands/µL      Monocytes Absolute 0.47 Thousand/µL      Eosinophils Absolute 0.14 Thousand/µL      Basophils Absolute 0.04 Thousands/µL                  CT head without contrast   Final Result by Garcia Gutierrez DO (07/05 1611)      No intracranial hemorrhage or acute large vessel territorial infarct. Question mild periventricular white matter hypodensity which is nonspecific. If real, findings could reflect changes of chronic migraine headaches and/or precocious chronic small vessel ischemic disease. Follow-up MRI of the brain may be considered for any persistent or worsening symptoms. Workstation performed: [id number]                    Procedures  ECG 12 Lead Documentation Only    Date/Time: 7/5/2023 3:37 PM    Performed by: Lisa Rutherford DO  Authorized by: Lisa Rutherford DO    Indications / Diagnosis:  Headache, HTN  ECG reviewed by me, the ED Provider: yes    Patient location:  ED  Previous ECG:     Previous ECG:  Unavailable  Interpretation:     Interpretation: normal    Rate:     ECG rate:  75    ECG rate assessment: normal    Rhythm:     Rhythm: sinus rhythm    Ectopy:     Ectopy: none    QRS:     QRS axis:  Normal  Conduction:     Conduction: normal    ST segments:     ST segments:  Normal  T waves:     T waves: normal               ED Course  ED Course as of 07/16/23 1218   Wed Jul 05, 2023   1642 Patient reports feeling better. I updated her with the results of her blood work and her CT scan result.   CT demonstrates a nonspecific finding in the white matter may be sequela of chronic migraines versus early vascular disease. Patient was informed about these findings. Recommend that she follow-up with a PCP and if she has persistent headaches may need to proceed with MRI. Plan is to start patient on antihypertensive treatment as well as provide a prescription for migraine type headaches that she can take at home. Medical Decision Making  41-year-old female presents for evaluation of headache, hypertension, nausea with dizziness. Differential diagnosis includes but is not limited to hypertensive urgency/emergency, migraine headache, intracranial hemorrhage, idiopathic intracranial hypertension, central venous thrombosis, PRES, tension headache, acute renal failure, electrolyte abnormality. Acute headache: acute illness or injury  Hypertension: acute illness or injury  Amount and/or Complexity of Data Reviewed  Independent Historian: parent     Details: Patient's mother at bedside to help provide history  Labs: ordered. Details: Labs ordered and independently interpreted by me, my interpretation is no acute abnormality  Radiology: ordered. Decision-making details documented in ED Course. Details: CT scan ordered by me and interpreted by radiology  ECG/medicine tests: ordered and independent interpretation performed. Decision-making details documented in ED Course. Risk  Prescription drug management. Risk Details: 41-year-old female presents with elevated blood pressure and acute on chronic recurrent headache. Patient is without focal neurologic deficits. Blood pressure did improve without intervention and headache resolved with migraine cocktail. Patient does have a slightly abnormal head CT and I do recommend that she follow-up with a PCP for recheck of blood pressure. I will initiate antihypertensive medication today and have patient have a recheck with PCP may need to increase dose at that time.   Patient is requesting prescription for medication to use for her recurrent headaches. We will trial Fioricet. Patient to attempt to reduce stressors, monitor caffeine intake and avoid over-the-counter medications that might elevate her blood pressure. Patient may need to follow-up with MRI if headaches persist.  Discussed signs and symptoms to return to the emergency department. Disposition  Final diagnoses:   Acute headache   Hypertension     Time reflects when diagnosis was documented in both MDM as applicable and the Disposition within this note     Time User Action Codes Description Comment    7/5/2023  4:46 PM Sophie Giron Add [R51.9] Acute headache     7/5/2023  4:49 PM Kim Lacey Add [I10] Hypertension       ED Disposition     ED Disposition   Discharge    Condition   Stable    Date/Time   Wed Jul 5, 2023  4:46 PM    Comment   Jorge Luis Romero discharge to home/self care.                Follow-up Information    None         Discharge Medication List as of 7/5/2023  4:51 PM      START taking these medications    Details   butalbital-acetaminophen-caffeine (FIORICET,ESGIC) -40 mg per tablet Take 1 tablet by mouth every 4 (four) hours as needed for headaches for up to 10 days, Starting Wed 7/5/2023, Until Sat 7/15/2023 at 2359, Normal      lisinopril (ZESTRIL) 5 mg tablet Take 1 tablet (5 mg total) by mouth daily, Starting Wed 7/5/2023, Normal         CONTINUE these medications which have NOT CHANGED    Details   acetaminophen (TYLENOL) 325 mg tablet Take 2 tablets (650 mg total) by mouth every 6 (six) hours as needed for mild pain, Starting Fri 5/25/2018, No Print      !! ibuprofen (MOTRIN) 400 mg tablet Take 1 tablet (400 mg total) by mouth every 6 (six) hours as needed for mild pain Do not take on an empty stomach, Starting Fri 5/25/2018, No Print      !! ibuprofen (MOTRIN) 600 mg tablet Take 1 tablet (600 mg total) by mouth every 6 (six) hours as needed for mild pain, Starting Sun 1/13/2019, Print !! - Potential duplicate medications found. Please discuss with provider. No discharge procedures on file.     PDMP Review       Value Time User    PDMP Reviewed  Yes 7/16/2022  5:43 AM Eugene Ramírez MD          ED Provider  Electronically Signed by           Sandro Rosales DO  07/16/23 8254

## 2023-07-06 LAB
ATRIAL RATE: 75 BPM
P AXIS: 70 DEGREES
PR INTERVAL: 152 MS
QRS AXIS: 52 DEGREES
QRSD INTERVAL: 86 MS
QT INTERVAL: 416 MS
QTC INTERVAL: 464 MS
T WAVE AXIS: 11 DEGREES
VENTRICULAR RATE: 75 BPM

## 2023-07-06 PROCEDURE — 93010 ELECTROCARDIOGRAM REPORT: CPT | Performed by: INTERNAL MEDICINE

## 2023-07-10 ENCOUNTER — OFFICE VISIT (OUTPATIENT)
Dept: FAMILY MEDICINE CLINIC | Facility: CLINIC | Age: 38
End: 2023-07-10
Payer: COMMERCIAL

## 2023-07-10 VITALS
TEMPERATURE: 97.6 F | OXYGEN SATURATION: 98 % | DIASTOLIC BLOOD PRESSURE: 88 MMHG | HEART RATE: 90 BPM | SYSTOLIC BLOOD PRESSURE: 140 MMHG | HEIGHT: 64 IN | WEIGHT: 293 LBS | BODY MASS INDEX: 50.02 KG/M2

## 2023-07-10 DIAGNOSIS — E66.01 MORBID OBESITY WITH BMI OF 50.0-59.9, ADULT (HCC): ICD-10-CM

## 2023-07-10 DIAGNOSIS — Z00.00 ANNUAL PHYSICAL EXAM: Primary | ICD-10-CM

## 2023-07-10 DIAGNOSIS — I10 PRIMARY HYPERTENSION: ICD-10-CM

## 2023-07-10 PROBLEM — J01.00 ACUTE MAXILLARY SINUSITIS: Status: RESOLVED | Noted: 2019-01-07 | Resolved: 2023-07-10
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
## 2023-07-10 PROBLEM — L08.9 DOG BITE OF LEFT FOREARM WITH INFECTION: Status: RESOLVED | Noted: 2018-05-22 | Resolved: 2023-07-10

## 2023-07-10 PROBLEM — S51.852A DOG BITE OF LEFT FOREARM WITH INFECTION: Status: RESOLVED | Noted: 2018-05-22 | Resolved: 2023-07-10

## 2023-07-10 PROCEDURE — 99385 PREV VISIT NEW AGE 18-39: CPT | Performed by: FAMILY MEDICINE

## 2023-07-10 RX ORDER — LISINOPRIL 10 MG/1
10 TABLET ORAL DAILY
Qty: 30 TABLET | Refills: 1 | Status: SHIPPED | OUTPATIENT
Start: 2023-07-10

## 2023-07-10 NOTE — PROGRESS NOTES
[facility]    NAME: Ricardo Romero  AGE: 40 y.o. SEX: female  : 1985     DATE: 7/10/2023     Assessment and Plan:     Problem List Items Addressed This Visit        Cardiovascular and Mediastinum    Primary hypertension    Relevant Medications    lisinopril (ZESTRIL) 10 mg tablet    Other Relevant Orders    TSH, 3rd generation with Free T4 reflex    Basic metabolic panel       Other    Morbid obesity with BMI of 50.0-59.9, adult ([address])    Relevant Orders    Ambulatory Referral to Sleep Medicine    TSH, 3rd generation with Free T4 reflex    Lipid panel   Other Visit Diagnoses     Annual physical exam    -  Primary        [place] weight loss  Increase lisinopril 10 mg daily. Obtain blood work. Schedule follow-up in 6 to 8 weeks to recheck blood pressure and review lab work. Referral made to sleep medicine for suspected sleep apnea. She is going to be meeting with a weight loss doctor and when gap. She has tried multiple diets in the past.  States she is always been overweight. Is not interested in bariatric surgery at this time. Plans on trialing the injectable medications. Immunizations and preventive care screenings were discussed with patient today. Appropriate education was printed on patient's after visit summary. Counseling:  Alcohol/drug use: discussed moderation in alcohol intake, the recommendations for healthy alcohol use, and avoidance of illicit drug use. Dental Health: discussed importance of regular tooth brushing, flossing, and dental visits. Injury prevention: discussed safety/seat belts, safety helmets, smoke detectors, carbon dioxide detectors, and smoking near bedding or upholstery. Sexual health: discussed sexually transmitted diseases, partner selection, use of condoms, avoidance of unintended pregnancy, and contraceptive alternatives.   Exercise: the importance of regular exercise/physical activity was discussed. Recommend exercise 3-5 times per week for at least 30 minutes. BMI Counseling: Body mass index is 58.28 kg/m². The BMI is above normal. Nutrition recommendations include decreasing portion sizes, consuming healthier snacks, reducing intake of saturated and trans fat and reducing intake of cholesterol. Exercise recommendations include moderate physical activity 150 minutes/week. No pharmacotherapy was ordered. Patient referred to PCP. Rationale for BMI follow-up plan is due to patient being overweight or obese. Depression Screening and Follow-up Plan: Patient was screened for depression during today's encounter. They screened negative with a PHQ-2 score of 0. Return in about 6 weeks (around 8/21/2023) for Recheck. Chief Complaint:     Chief Complaint   Patient presents with   • Well Check   • Follow-up     ER follow up Darlene Emery 7/5/23 Dx Hypertension      History of Present Illness:     Adult Annual Physical   Patient here for a comprehensive physical exam. The patient reports problems - see below. Chief Complaint   Patient presents with   • Well Check   • Follow-up     ER follow up Darlene Emery 7/5/23 Dx Hypertension     Pressure at home 145-150. Diet and Physical Activity  Diet/Nutrition: poor diet and high fat diet. Exercise: no formal exercise. Depression Screening  PHQ-2/9 Depression Screening    Little interest or pleasure in doing things: 0 - not at all  Feeling down, depressed, or hopeless: 0 - not at all  PHQ-2 Score: 0  PHQ-2 Interpretation: Negative depression screen       General Health  Sleep: gets 4-6 hours of sleep on average, snores loudly and experiences daytime hypersomnolence. Hearing: normal - bilateral.  Vision: no vision problems. Dental: regular dental visits. /GYN Health  Last menstrual period: Monthly   Contraceptive method: none .   History of STDs?: no.     Review of Systems:     Review of Systems   Respiratory: Negative for shortness of breath. Cardiovascular: Negative for chest pain and palpitations. Musculoskeletal: Positive for neck pain. Neurological: Positive for headaches. Answers for HPI/ROS submitted by the patient on 2023  Chronicity: new  Onset: yesterday  Progression since onset: waxing and waning  Condition status: uncontrolled  anxiety: No  blurred vision: No  malaise/fatigue: Yes  orthopnea: No  peripheral edema: No  PND: No  sweats: No  Agents associated with hypertension: no associated agents  CAD risks: no known risk factors  Compliance problems: diet, exercise   Past Medical History:     Past Medical History:   Diagnosis Date   • Morbid obesity with BMI of 50.0-59.9, adult ([address] [facility]) 2018   • Primary hypertension 7/10/2023      Past Surgical History:     Past Surgical History:   Procedure Laterality Date   • CHOLECYSTECTOMY     • EYE SURGERY Bilateral 2020    Lasik bilateral      Social History:     Social History     Socioeconomic History   • Marital status: Single     Spouse name: None   • Number of children: None   • Years of education: None   • Highest education level: None   Occupational History   • Occupation: Retail   • Occupation: Owner of Business   Tobacco Use   • Smoking status: Never   • Smokeless tobacco: Never   Substance and Sexual Activity   • Alcohol use: No     Comment: occasional   • Drug use: No   • Sexual activity: None   Other Topics Concern   • None   Social History Narrative    · Most recent tobacco use screenin2018      · Exercise level:    Moderate      · Diet:   Regular      · General stress level:   Medium       · Alcohol intake:   Occasional       · How many days in the past year have you had a heavy drinking consumption (4+ female, 5+ male):   0      · Caffeine intake:   Occasional      · Guns present in home:   No      · Seat belts used routinely:   Yes      · Sunscreen used routinely:   No      · Smoke alarm in home: Yes      · Advance directive: Yes      · Salt Intake:   medium      Social Determinants of Health     Financial Resource Strain: Not on file   Food Insecurity: Not on file   Transportation Needs: Not on file   Physical Activity: Not on file   Stress: Not on file   Social Connections: Not on file   Intimate Partner Violence: Not on file   Housing Stability: Not on file      Family History:     Family History   Problem Relation Age of Onset   • No Known Problems Mother    • No Known Problems Father       Current Medications:     Current Outpatient Medications   Medication Sig Dispense Refill   • acetaminophen (TYLENOL) 325 mg tablet Take 2 tablets (650 mg total) by mouth every 6 (six) hours as needed for mild pain 30 tablet 0   • butalbital-acetaminophen-caffeine (FIORICET,ESGIC) -40 mg per tablet Take 1 tablet by mouth every 4 (four) hours as needed for headaches for up to 10 days 20 tablet 0   • ibuprofen (MOTRIN) 400 mg tablet Take 1 tablet (400 mg total) by mouth every 6 (six) hours as needed for mild pain Do not take on an empty stomach  0   • lisinopril (ZESTRIL) 10 mg tablet Take 1 tablet (10 mg total) by mouth daily 30 tablet 1   • ibuprofen (MOTRIN) 600 mg tablet Take 1 tablet (600 mg total) by mouth every 6 (six) hours as needed for mild pain (Patient not taking: Reported on 7/10/2023) 20 tablet 0     No current facility-administered medications for this visit. Allergies: Allergies   Allergen Reactions   • Amoxicillin Hives   • Cefaclor Hives     Other reaction(s): hives, joint pain   • Cephalosporins    • Wheat Bran - Food Allergy       Physical Exam:     /88 (BP Location: Left arm, Patient Position: Sitting, Cuff Size: Large)   Pulse 90   Temp 97.6 °F (36.4 °C)   Ht 5' 4" (1.626 m)   Wt (!) 154 kg (339 lb 8 oz)   SpO2 98%   BMI 58.28 kg/m²     Physical Exam  Vitals and nursing note reviewed. Constitutional:       General: She is not in acute distress.      Appearance: Normal appearance. She is well-developed. She is obese. HENT:      Head: Normocephalic and atraumatic. Eyes:      Extraocular Movements: Extraocular movements intact. Conjunctiva/sclera: Conjunctivae normal.      Pupils: Pupils are equal, round, and reactive to light. Cardiovascular:      Rate and Rhythm: Normal rate and regular rhythm. Pulmonary:      Effort: Pulmonary effort is normal.      Breath sounds: Normal breath sounds. Abdominal:      General: Bowel sounds are normal.      Palpations: Abdomen is soft. Musculoskeletal:         General: Normal range of motion. Cervical back: Normal range of motion. Skin:     General: Skin is warm and dry. Neurological:      General: No focal deficit present. Mental Status: She is alert and oriented to person, place, and time.    Psychiatric:         Mood and Affect: Mood normal.         Speech: Speech normal.         Behavior: Behavior normal.          Sujey Ramirez MD   [address]

## 2023-07-13 ENCOUNTER — HOSPITAL ENCOUNTER (EMERGENCY)
Facility: HOSPITAL | Age: 38
Discharge: HOME | End: 2023-07-13
Attending: EMERGENCY MEDICINE
Payer: COMMERCIAL

## 2023-07-13 ENCOUNTER — APPOINTMENT (EMERGENCY)
Dept: CT IMAGING | Facility: HOSPITAL | Age: 38
End: 2023-07-13
Payer: COMMERCIAL

## 2023-07-13 ENCOUNTER — APPOINTMENT (EMERGENCY)
Dept: RADIOLOGY | Facility: HOSPITAL | Age: 38
End: 2023-07-13
Payer: COMMERCIAL

## 2023-07-13 VITALS
WEIGHT: 293 LBS | SYSTOLIC BLOOD PRESSURE: 124 MMHG | BODY MASS INDEX: 50.02 KG/M2 | RESPIRATION RATE: 18 BRPM | HEIGHT: 64 IN | DIASTOLIC BLOOD PRESSURE: 75 MMHG | OXYGEN SATURATION: 98 % | TEMPERATURE: 98.3 F | HEART RATE: 97 BPM

## 2023-07-13 DIAGNOSIS — R00.0 RAPID HEART RATE: Primary | ICD-10-CM

## 2023-07-13 LAB
2HR DELTA HS TROPONIN: 0 NG/L
ALBUMIN SERPL BCP-MCNC: 4.5 G/DL (ref 3.5–5)
ALP SERPL-CCNC: 53 U/L (ref 34–104)
ALT SERPL W P-5'-P-CCNC: 28 U/L (ref 7–52)
ANION GAP SERPL CALCULATED.3IONS-SCNC: 9 MMOL/L
AST SERPL W P-5'-P-CCNC: 21 U/L (ref 13–39)
B-HCG SERPL-ACNC: <1 MIU/ML (ref 0–5)
BASOPHILS # BLD AUTO: 0.05 THOUSANDS/ÂΜL (ref 0–0.1)
BASOPHILS NFR BLD AUTO: 1 % (ref 0–1)
BILIRUB SERPL-MCNC: 0.42 MG/DL (ref 0.2–1)
BUN SERPL-MCNC: 18 MG/DL (ref 5–25)
CALCIUM SERPL-MCNC: 9.8 MG/DL (ref 8.4–10.2)
CARDIAC TROPONIN I PNL SERPL HS: 3 NG/L
CARDIAC TROPONIN I PNL SERPL HS: 3 NG/L
CHLORIDE SERPL-SCNC: 101 MMOL/L (ref 96–108)
CO2 SERPL-SCNC: 25 MMOL/L (ref 21–32)
CREAT SERPL-MCNC: 0.81 MG/DL (ref 0.6–1.3)
D DIMER PPP FEU-MCNC: 0.3 UG/ML FEU
EOSINOPHIL # BLD AUTO: 0.12 THOUSAND/ÂΜL (ref 0–0.61)
EOSINOPHIL NFR BLD AUTO: 1 % (ref 0–6)
ERYTHROCYTE [DISTWIDTH] IN BLOOD BY AUTOMATED COUNT: 13.5 % (ref 11.6–15.1)
GFR SERPL CREATININE-BSD FRML MDRD: 93 ML/MIN/1.73SQ M
GLUCOSE SERPL-MCNC: 86 MG/DL (ref 65–140)
HCT VFR BLD AUTO: 43.5 % (ref 34.8–46.1)
HGB BLD-MCNC: 13.9 G/DL (ref 11.5–15.4)
IMM GRANULOCYTES # BLD AUTO: 0.02 THOUSAND/UL (ref 0–0.2)
IMM GRANULOCYTES NFR BLD AUTO: 0 % (ref 0–2)
LYMPHOCYTES # BLD AUTO: 2.33 THOUSANDS/ÂΜL (ref 0.6–4.47)
LYMPHOCYTES NFR BLD AUTO: 23 % (ref 14–44)
MCH RBC QN AUTO: 28.7 PG (ref 26.8–34.3)
MCHC RBC AUTO-ENTMCNC: 32 G/DL (ref 31.4–37.4)
MCV RBC AUTO: 90 FL (ref 82–98)
MONOCYTES # BLD AUTO: 0.53 THOUSAND/ÂΜL (ref 0.17–1.22)
MONOCYTES NFR BLD AUTO: 5 % (ref 4–12)
NEUTROPHILS # BLD AUTO: 6.99 THOUSANDS/ÂΜL (ref 1.85–7.62)
NEUTS SEG NFR BLD AUTO: 70 % (ref 43–75)
NRBC BLD AUTO-RTO: 0 /100 WBCS
PLATELET # BLD AUTO: 363 THOUSANDS/UL (ref 149–390)
PMV BLD AUTO: 9.9 FL (ref 8.9–12.7)
POTASSIUM SERPL-SCNC: 3.9 MMOL/L (ref 3.5–5.3)
PROT SERPL-MCNC: 8 G/DL (ref 6.4–8.4)
RBC # BLD AUTO: 4.84 MILLION/UL (ref 3.81–5.12)
SODIUM SERPL-SCNC: 135 MMOL/L (ref 135–147)
WBC # BLD AUTO: 10.04 THOUSAND/UL (ref 4.31–10.16)

## 2023-07-13 PROCEDURE — 84484 ASSAY OF TROPONIN QUANT: CPT | Performed by: EMERGENCY MEDICINE

## 2023-07-13 PROCEDURE — 96360 HYDRATION IV INFUSION INIT: CPT

## 2023-07-13 PROCEDURE — 99285 EMERGENCY DEPT VISIT HI MDM: CPT

## 2023-07-13 PROCEDURE — 80053 COMPREHEN METABOLIC PANEL: CPT | Performed by: EMERGENCY MEDICINE

## 2023-07-13 PROCEDURE — 84702 CHORIONIC GONADOTROPIN TEST: CPT | Performed by: EMERGENCY MEDICINE

## 2023-07-13 PROCEDURE — 71045 X-RAY EXAM CHEST 1 VIEW: CPT

## 2023-07-13 PROCEDURE — 36415 COLL VENOUS BLD VENIPUNCTURE: CPT | Performed by: EMERGENCY MEDICINE

## 2023-07-13 PROCEDURE — 85025 COMPLETE CBC W/AUTO DIFF WBC: CPT | Performed by: EMERGENCY MEDICINE

## 2023-07-13 PROCEDURE — 85379 FIBRIN DEGRADATION QUANT: CPT | Performed by: EMERGENCY MEDICINE

## 2023-07-13 PROCEDURE — 93005 ELECTROCARDIOGRAM TRACING: CPT

## 2023-07-13 PROCEDURE — 99285 EMERGENCY DEPT VISIT HI MDM: CPT | Performed by: EMERGENCY MEDICINE

## 2023-07-13 RX ORDER — ACETAMINOPHEN 325 MG/1
650 TABLET ORAL ONCE
Status: COMPLETED | OUTPATIENT
Start: 2023-07-13 | End: 2023-07-13

## 2023-07-13 RX ADMIN — SODIUM CHLORIDE 1000 ML: 0.9 INJECTION, SOLUTION INTRAVENOUS at 18:28

## 2023-07-13 RX ADMIN — ACETAMINOPHEN 650 MG: 325 TABLET, FILM COATED ORAL at 18:40

## 2023-07-13 NOTE — ED PROVIDER NOTES
History  Chief Complaint   Patient presents with   • Rapid Heart Rate     Pt c/o rapid HR with transient SOB and chest pressure with onset today around 1530     (Marshall Parry) Marshall Parry is a 40 y.o. female presenting for rapid heart rate starting at around  today and have lasted for a few hours, improved with laying down with little activity, worsened when she gets up and moves around. Patient was at work when she stood up and felt like her heart was racing also experienced nausea without vomiting, dizziness described at the room tilting, and mild headache. Also reports mild parasternal chest "tightness" but not overt pain. Patient denies diaphoresis, abdominal pain, visual changes, new numbness weakness or tingling in the extremities. She has not taken any medications to alleviate symptoms, but presented to the ED. Patient is asymptomatic in the ED. Was recently in the ED for headache and hypertension and lightheadedness with nausea, but this event does not feel exactly like that. Patient currently denies fevers, chills, headaches, dizziness, chest pain, palpitations, shortness of breath, abdominal pain, nausea, vomiting, constipation, diarrhea, urinary frequency, dysuria, and new extremity weakness, swelling and pain. Reports normal sleep and normal appetite, eating well. Ambulating well at baseline.     Allergies include:  -- Amoxicillin -- Hives  -- Cefaclor -- Hives   --  Other reaction(s): hives, joint pain  -- Cephalosporins   -- Wheat Bran - Food Allergy       Immunizations:    Immunization History  Administered            Date(s) Administered   DTP                   1986  01/21/1987  03/03/1987                           11/16/1987  04/03/1991     Hep B, Adolescent or Pediatric                         08/07/2002 07/07/2004     IPV                   1986 01/02/1987 03/03/1987 11/16/1987 04/03/1991     MMR                   11/16/1987 08/28/1997     Meningococcal MCV4P   07/20/2004     Td (adult), Unspecified                         04/11/2001     Tdap                  05/20/2018    Immunizations Reviewed. Prior to Admission Medications   Prescriptions Last Dose Informant Patient Reported? Taking?   acetaminophen (TYLENOL) 325 mg tablet  Self No No   Sig: Take 2 tablets (650 mg total) by mouth every 6 (six) hours as needed for mild pain   butalbital-acetaminophen-caffeine (FIORICET,ESGIC) -40 mg per tablet  Self No No   Sig: Take 1 tablet by mouth every 4 (four) hours as needed for headaches for up to 10 days   ibuprofen (MOTRIN) 400 mg tablet  Self No No   Sig: Take 1 tablet (400 mg total) by mouth every 6 (six) hours as needed for mild pain Do not take on an empty stomach   ibuprofen (MOTRIN) 600 mg tablet  Self No No   Sig: Take 1 tablet (600 mg total) by mouth every 6 (six) hours as needed for mild pain   Patient not taking: Reported on 7/10/2023   lisinopril (ZESTRIL) 10 mg tablet   No No   Sig: Take 1 tablet (10 mg total) by mouth daily      Facility-Administered Medications: None       Past Medical History:   Diagnosis Date   • Morbid obesity with BMI of 50.0-59.9, adult ([address] [facility]) 5/23/2018   • Primary hypertension 7/10/2023       Past Surgical History:   Procedure Laterality Date   • CHOLECYSTECTOMY  2006   • EYE SURGERY Bilateral 08/2020    Lasik bilateral       Family History   Problem Relation Age of Onset   • No Known Problems Mother    • No Known Problems Father      I have reviewed and agree with the history as documented. E-Cigarette/Vaping     E-Cigarette/Vaping Substances     Social History     Tobacco Use   • Smoking status: Never   • Smokeless tobacco: Never   Substance Use Topics   • Alcohol use: No     Comment: occasional   • Drug use: No        Review of Systems   Constitutional: Negative for chills and fever. Eyes: Negative for pain and visual disturbance.    Respiratory: Negative for cough and shortness of breath. Cardiovascular: Negative for chest pain, palpitations and leg swelling. Gastrointestinal: Negative for abdominal pain, constipation, diarrhea, nausea and vomiting. Genitourinary: Negative for dysuria, frequency and hematuria. Musculoskeletal: Negative for arthralgias and back pain. Skin: Negative for color change and rash. Neurological: Negative for dizziness, seizures, syncope, weakness, light-headedness and headaches. Psychiatric/Behavioral: Negative for dysphoric mood. All other systems reviewed and are negative. Physical Exam  ED Triage Vitals [07/13/23 1718]   Temperature Pulse Respirations Blood Pressure SpO2   98.3 °F (36.8 °C) (!) 115 20 147/85 98 %      Temp Source Heart Rate Source Patient Position - Orthostatic VS BP Location FiO2 (%)   Oral -- -- -- --      Pain Score       3             Orthostatic Vital Signs  Vitals:    07/13/23 1718   BP: 147/85   Pulse: (!) 115       Physical Exam  Vitals and nursing note reviewed. Constitutional:       General: She is not in acute distress. Appearance: She is well-developed. She is not ill-appearing or toxic-appearing. HENT:      Head: Normocephalic and atraumatic. Mouth/Throat:      Mouth: Mucous membranes are moist.      Pharynx: Oropharynx is clear. Eyes:      Extraocular Movements: Extraocular movements intact. Conjunctiva/sclera: Conjunctivae normal.   Cardiovascular:      Rate and Rhythm: Normal rate and regular rhythm. Pulses: Normal pulses. Heart sounds: Normal heart sounds. No murmur heard. Pulmonary:      Effort: Pulmonary effort is normal. No respiratory distress. Breath sounds: Normal breath sounds. No wheezing, rhonchi or rales. Abdominal:      General: Abdomen is flat. Palpations: Abdomen is soft. Tenderness: There is no abdominal tenderness. There is no right CVA tenderness or left CVA tenderness. Musculoskeletal:      Cervical back: Normal range of motion.       Right lower leg: No edema. Left lower leg: No edema. Skin:     General: Skin is warm and dry. Capillary Refill: Capillary refill takes less than 2 seconds. Neurological:      General: No focal deficit present. Mental Status: She is alert and oriented to person, place, and time. Sensory: No sensory deficit. Motor: No weakness.    Psychiatric:         Mood and Affect: Mood normal.         Behavior: Behavior normal.         ED Medications  Medications   sodium chloride 0.9 % bolus 1,000 mL (has no administration in time range)       Diagnostic Studies  Results Reviewed     Procedure Component Value Units Date/Time    HS Troponin I 2hr [[id number]]     Lab Status: No result Specimen: Blood     HS Troponin 0hr (reflex protocol) [[id number]]  (Normal) Collected: 07/13/23 1733    Lab Status: Final result Specimen: Blood from Arm, Right Updated: 07/13/23 1807     hs TnI 0hr 3 ng/L     D-Dimer [[id number]]     Lab Status: No result Specimen: Blood     Quantitative hCG [[id number]]     Lab Status: No result Specimen: Blood     Comprehensive metabolic panel [[id number]] Collected: 07/13/23 1733    Lab Status: Final result Specimen: Blood from Arm, Right Updated: 07/13/23 1759     Sodium 135 mmol/L      Potassium 3.9 mmol/L      Chloride 101 mmol/L      CO2 25 mmol/L      ANION GAP 9 mmol/L      BUN 18 mg/dL      Creatinine 0.81 mg/dL      Glucose 86 mg/dL      Calcium 9.8 mg/dL      AST 21 U/L      ALT 28 U/L      Alkaline Phosphatase 53 U/L      Total Protein 8.0 g/dL      Albumin 4.5 g/dL      Total Bilirubin 0.42 mg/dL      eGFR 93 ml/min/1.73sq m     Narrative:      [place] guidelines for Chronic Kidney Disease (CKD):   •  Stage 1 with normal or high GFR (GFR > 90 mL/min/1.73 square meters)  •  Stage 2 Mild CKD (GFR = 60-89 mL/min/1.73 square meters)  •  Stage 3A Moderate CKD (GFR = 45-59 mL/min/1.73 square meters)  •  Stage 3B Moderate CKD (GFR = 30-44 mL/min/1.73 square meters)  •  Stage 4 Severe CKD (GFR = 15-29 mL/min/1.73 square meters)  •  Stage 5 End Stage CKD (GFR <15 mL/min/1.73 square meters)  Note: GFR calculation is accurate only with a steady state creatinine    CBC and differential [[id number]] Collected: 07/13/23 1733    Lab Status: Final result Specimen: Blood from Arm, Right Updated: 07/13/23 1745     WBC 10.04 Thousand/uL      RBC 4.84 Million/uL      Hemoglobin 13.9 g/dL      Hematocrit 43.5 %      MCV 90 fL      MCH 28.7 pg      MCHC 32.0 g/dL      RDW 13.5 %      MPV 9.9 fL      Platelets 903 Thousands/uL      nRBC 0 /100 WBCs      Neutrophils Relative 70 %      Immat GRANS % 0 %      Lymphocytes Relative 23 %      Monocytes Relative 5 %      Eosinophils Relative 1 %      Basophils Relative 1 %      Neutrophils Absolute 6.99 Thousands/µL      Immature Grans Absolute 0.02 Thousand/uL      Lymphocytes Absolute 2.33 Thousands/µL      Monocytes Absolute 0.53 Thousand/µL      Eosinophils Absolute 0.12 Thousand/µL      Basophils Absolute 0.05 Thousands/µL                  XR chest 1 view portable    (Results Pending)         Procedures  Procedures      ED Course  ED Course as of 07/14/23 0205   Thu Jul 13, 2023   1727 ECG 12 lead  Normal sinus rhythm, normal axis, no ST elevations or depressions. 96 bpm   1727 Blood Pressure: 147/85   1731 Pulse(!): 115  Tachycardia. Per chart review HR usually in 90's during office visits   [id number] CBC and differential  CBC wnl   1810 hs TnI 0hr: 3   1810 Comprehensive metabolic panel  CMP wnl   1847 Orthostatic vitals performed. Initial standing . After 3 minutes improved to 104.   1929 D-Dimer, Quant: 0.30  D-dimer quant 0.30. wnl. With low pretest of VTE, unlikely to be PE   1953 HCG QUANTITATIVE: <1  hCG <1   2056 hs TnI 2hr: 3  2 hr tnl 3   2118 Repeat orthostatics following fluid repletion showed no more tachycardia.  Vitals have overall improved and patient is asymptomatic and reports feeling better overall SBIRT 22yo+    Flowsheet Row Most Recent Value   Initial Alcohol Screen: US AUDIT-C     1. How often do you have a drink containing alcohol? 0 Filed at: 07/13/2023 1722   2. How many drinks containing alcohol do you have on a typical day you are drinking? 0 Filed at: 07/13/2023 1722   3b. FEMALE Any Age, or MALE 65+: How often do you have 4 or more drinks on one occassion? 0 Filed at: 07/13/2023 1722   Audit-C Score 0 Filed at: 07/13/2023 1722   NIRMAL: How many times in the past year have you. .. Used an illegal drug or used a prescription medication for non-medical reasons? Never Filed at: 07/13/2023 1722          Wells' Criteria for PE    Flowsheet Row Most Recent Value   Wells' Criteria for PE    Clinical signs and symptoms of DVT 0 Filed at: 07/13/2023 1758   PE is primary diagnosis or equally likely 0 Filed at: 07/13/2023 1758   HR >100 1.5 Filed at: 07/13/2023 1758   Immobilization at least 3 days or Surgery in the previous 4 weeks 0 Filed at: 07/13/2023 1758   Previous, objectively diagnosed PE or DVT 0 Filed at: 07/13/2023 1758   Hemoptysis 0 Filed at: 07/13/2023 1758   Malignancy with treatment within 6 months or palliative 0 Filed at: 07/13/2023 1758   Wells' Criteria Total 1.5 Filed at: 07/13/2023 2350 Jessica Russ Making  (Doris Owens) Doris Owens is a 40 y.o. female presenting for rapid heart rate starting at around  today and have lasted for a few hours, improved with laying down with little activity, worsened when she gets up and moves around. Patient was at work when she stood up and felt like her heart was racing also experienced nausea without vomiting, dizziness described at the room tilting, and mild headache. Also reports mild parasternal chest "tightness" but not overt pain. Patient denies diaphoresis, abdominal pain, visual changes, new numbness weakness or tingling in the extremities.  She has not taken any medications to alleviate symptoms, but presented to the ED. Patient seen and examined regular rate and rhythm, lungs clear bilaterally, abdomen nontender nondistended. Temp:  (98.3 °F (36.8 °C)) 98.3 °F (36.8 °C)  HR:  () 97  Resp:  (18-20) 18  BP: (118-147)/(56-85) 124/75    Differential diagnosis includes but is not limited to     DDx including but not limited to: Recent addition of lisinopril and then increase in dose in the last week. Possible orthostatic component, will check orthostatic vitals, will give fluids and then will re-assess orthostatic vitals. Possible metabolic abnormality will get labwork, cardiac arrhythmia, Low concern for ACS/ but will still do cardiac workup. Low concern for PE with normal saturation and no chest pain or history of clot but will calculate Wells Score and get d-dimer. Will assess for thyroid disease. Possible anxiety component but will rule out concerning disease processes.     Due to patient's history and presentation the following laboratory evidence was collected:  Recent Results (from the past 12 hour(s))  -ECG 12 lead:   Collection Time: 07/13/23  5:24 PM       Result                      Value             Ref Range           Ventricular Rate            96                BPM                 Atrial Rate                 96                BPM                 AL Interval                 146               ms                  QRSD Interval               84                ms                  QT Interval                 350               ms                  QTC Interval                442               ms                  P Axis                      68                degrees             QRS Axis                    37                degrees             T Wave Axis                 63                degrees        -CBC and differential:   Collection Time: 07/13/23  5:33 PM       Result                      Value             Ref Range           WBC                         10.04             4.31 - 10.16*       RBC                         4.84              3.81 - 5.12 *       Hemoglobin                  13.9              11.5 - 15.4 *       Hematocrit                  43.5              34.8 - 46.1 %       MCV                         90                82 - 98 fL          MCH                         28.7              26.8 - 34.3 *       MCHC                        32.0              31.4 - 37.4 *       RDW                         13.5              11.6 - 15.1 %       MPV                         9.9               8.9 - 12.7 fL       Platelets                   363               149 - 390 Th*       nRBC                        0                 /100 WBCs           Neutrophils Relative        70                43 - 75 %           Immat GRANS %               0                 0 - 2 %             Lymphocytes Relative        23                14 - 44 %           Monocytes Relative          5                 4 - 12 %            Eosinophils Relative        1                 0 - 6 %             Basophils Relative          1                 0 - 1 %             Neutrophils Absolute        6.99              1.85 - 7.62 *       Immature Grans Absolute     0.02              0.00 - 0.20 *       Lymphocytes Absolute        2.33              0.60 - 4.47 *       Monocytes Absolute          0.53              0.17 - 1.22 *       Eosinophils Absolute        0.12              0.00 - 0.61 *       Basophils Absolute          0.05              0.00 - 0.10 *  -Comprehensive metabolic panel:   Collection Time: 07/13/23  5:33 PM       Result                      Value             Ref Range           Sodium                      135               135 - 147 mm*       Potassium                   3.9               3.5 - 5.3 mm*       Chloride                    101               96 - 108 mmo*       CO2                         25                21 - 32 mmol*       ANION GAP                   9                 mmol/L              BUN 18                5 - 25 mg/dL        Creatinine                  0.81              0.60 - 1.30 *       Glucose                     86                65 - 140 mg/*       Calcium                     9.8               8.4 - 10.2 m*       AST                         21                13 - 39 U/L         ALT                         28                7 - 52 U/L          Alkaline Phosphatase        53                34 - 104 U/L        Total Protein               8.0               6.4 - 8.4 g/*       Albumin                     4.5               3.5 - 5.0 g/*       Total Bilirubin             0.42              0.20 - 1.00 *       eGFR                        93                ml/min/1.73s*  -HS Troponin 0hr (reflex protocol):   Collection Time: 07/13/23  5:33 PM       Result                      Value             Ref Range           hs TnI 0hr                  3                 "Refer to [facility]*  -D-Dimer:   Collection Time: 07/13/23  5:33 PM       Result                      Value             Ref Range           D-Dimer, Quant              0.30              <0.50 ug/ml *  -Quantitative hCG:   Collection Time: 07/13/23  5:33 PM       Result                      Value             Ref Range           HCG, Quant                  <1                0 - 5 mIU/mL   -HS Troponin I 2hr:   Collection Time: 07/13/23  7:31 PM       Result                      Value             Ref Range           hs TnI 2hr                  3                 "Refer to [facility]*       Delta 2hr hsTnI             0                 <20 ng/L         Due to patient's history and presentation the following imaging was collected:  XR chest 1 view portable   ED Interpretation    Per my independent interpretation:  no acute cardiopulmonary process     EKG: Sinus rhythm, normal axis, no ST elevations or depressions. Negative troponins at 0 and 2 hours, unlikely to be ACS/MI. D-dimer negative, Wells 1.5, PERC 1.     CBC and CMP within normal limits. Patient was administered:    Medication Administration - last 24 hours from 07/13/2023 0210 to   07/14/2023 0210       Date/Time Order Dose Route Action Action by     07/13/2023 1916 EDT sodium chloride 0.9 % bolus 1,000 mL 0 mL   Intravenous Stopped Rashid Farah RN     07/13/2023 1828 EDT sodium chloride 0.9 % bolus 1,000 mL 1,000 mL   Intravenous New Bag Daniela Sood RN     07/13/2023 1840 EDT acetaminophen (TYLENOL) tablet 650 mg 650 mg Oral   Given Daniela Sood RN     Upon re-assessment patient has not had a similar event with similar symptoms during time in ED. Patient appears well, is nontoxic appearing, expresses understanding and agrees with plan of care at this time. In light of this patient would benefit from outpatient management. Patient was discharged with return precautions especially related to cardiac etiology, and told to follow-up with her PCP in the next day or so to re-evaluate symptoms. Amount and/or Complexity of Data Reviewed  Labs: ordered. Decision-making details documented in ED Course. Radiology: ordered and independent interpretation performed. ECG/medicine tests:  Decision-making details documented in ED Course. Risk  OTC drugs. Disposition  Final diagnoses:   None     ED Disposition     None      Follow-up Information    None         Patient's Medications   Discharge Prescriptions    No medications on file     No discharge procedures on file. PDMP Review       Value Time User    PDMP Reviewed  Yes 7/16/2022  5:43 AM Ronald Hercules MD           ED Provider  Attending physically available and evaluated Armand Ruiz. I managed the patient along with the ED Attending.     Electronically Signed by         Morales Ng MD  07/14/23 0221

## 2023-07-13 NOTE — ED ATTENDING ATTESTATION
7/13/2023  IShruti MD, saw and evaluated the patient. I have discussed the patient with the resident/non-physician practitioner and agree with the resident's/non-physician practitioner's findings, Plan of Care, and MDM as documented in the resident's/non-physician practitioner's note, except where noted. All available labs and Radiology studies were reviewed. I was present for key portions of any procedure(s) performed by the resident/non-physician practitioner and I was immediately available to provide assistance. At this point I agree with the current assessment done in the Emergency Department. I have conducted an independent evaluation of this patient a history and physical is as follows:    History    Patient is a 28-year-old female, with a history significant for hypertension (recently diagnosed, started on lisinopril 5 mg and increase to 10 mg), who presents to the ED today with multiple complaints. At 330pm dizziness characterized as being tilted that is also resolved as well as chest pain (nonexertional, nonpleuritic, nonradiating -no associated vomiting, diaphoresis, or dyspnea) which remains present albeit milder than when it started. There is also associated nausea and tachycardia to about 115. Patient's symptoms began after she was standing for a prolonged period of time. Patient denies trauma abdominal pain, fever, urinary symptoms, weakness, numbness, vision change, dysphagia. Patient is without other concerns at this time. Patient denies headache at this time or associated with this event. Patient initially started describing headache but reported that this was during a prior episode that she was evaluated for. Patient denies morning headaches. Patient denies hot or cold flashes, constipation or diarrhea, weight loss or weight gain.     ROS  Patient denies: Fever; dysphagia; vision change; dyspnea; abdominal pain; polyuria; dysuria; rash; weakness; numbness; difficulty walking; confusion; rash. Physical Exam    GENERAL APPEARANCE: NAD  NEURO: Cranial nerves 2-12 intact. 5/5 strength in all four extremities including finger extension against resistance. Sensation to light touch subjectively intact/equal in all four extremities and the face. No nuchal rigidity  Patient is speaking clearly in complete sentences. Patient is answering appropriately and able to follow commands. HEENT: PERRL, Moist mucous membranes, external ears normal, nose normal  Neck: No cervical adenopathy  CV: RRR. No murmurs, rubs, gallops  LUNGS: Clear to auscultation: No wheezes, stridor, rhonchi, rales  GI: Abdomen non-distended. Soft. Non-tender and without rebound or guarding   : Deferred at this time  MSK: No deformity. Skin: Warm and dry  Capillary refill: <2 seconds    Assessment/Plan  Headache/Dizziness/CP  -Concern for ACS, PE, orthostatic hypotension, dehydration, primary headache, medication side effect from lisinopril. No reason suspect meningitis, pseudotumor, dural venous sinus thrombosis, press at this time based on history physical exam.  -Evaluate with pregnancy test, cardiac work-up, D-dimer, orthostatic vital signs  -Will manage with fluids and further based on work-up. ED Course  ED Course as of 07/14/23 0036   Thu Jul 13, 2023   1728 ECG per my independent interpretation: Normal rate, regular rhythm, no ectopy, normal axis, no ST elevations or depressions     1807 hs TnI 0hr: 3  WNL   1851 D-Dimer, Quant: 0.30  WNL. Given low pretest probability of VTE, very unlikely PE   1851 hs TnI 0hr: 3  WNL   Fri Jul 14, 2023   0035 Orthostatics improved after fluids.           Critical Care Time  Procedures

## 2023-07-14 LAB
ATRIAL RATE: 96 BPM
P AXIS: 68 DEGREES
PR INTERVAL: 146 MS
QRS AXIS: 37 DEGREES
QRSD INTERVAL: 84 MS
QT INTERVAL: 350 MS
QTC INTERVAL: 442 MS
T WAVE AXIS: 63 DEGREES
VENTRICULAR RATE: 96 BPM

## 2023-07-14 PROCEDURE — 93010 ELECTROCARDIOGRAM REPORT: CPT | Performed by: INTERNAL MEDICINE

## 2023-07-14 NOTE — ED NOTES
Patient's orthostatic vitals negative at the bedside - she denied any dizziness or feeling of palpitations.       07/13/23 2107 07/13/23 2108 07/13/23 2109   Vital Signs   Heart Rate Source Monitor Monitor Monitor   Respirations 18  --   --    Blood Pressure 120/70 121/72 122/72   MAP (mmHg) 83  --   --    BP Location Left arm Left arm Left arm   BP Method Automatic Automatic Automatic   Patient Position - Orthostatic VS Lying - Orthostatic VS Sitting - Orthostatic VS Standing - Orthostatic VS      07/13/23 2114   Vital Signs   Heart Rate Source Monitor   Respirations  --    Blood Pressure 124/75   MAP (mmHg) 91   BP Location Left arm   BP Method Automatic   Patient Position - Orthostatic VS Standing for 3 minutes - Orthostatic VS          Lupe Gilmore RN  07/13/23 2117

## 2023-07-14 NOTE — DISCHARGE INSTRUCTIONS
Requested Prescriptions     Pending Prescriptions Disp Refills   • ALBUTEROL 108 (90 Base) MCG/ACT Inhalation Aero Soln [Pharmacy Med Name: ALBUTEROL HFA (PROAIR) INHALER] 8.5 each 0     Sig: INHALE 2 PUFFS INTO THE LUNGS EVERY 6 HOURS AS NEEDED (COUGH). You were evaluated in the emergency department for rapid heart rate. You can access your current and pending results through [address] [place] [address] [facility]. A radiologist will take a second look at your X-Rays, if you had any, and you will be contacted with any new findings. You should follow-up with your primary care provider, as soon as possible, for re-evaluation. Their phone number is also included on this paperwork. Your workup revealed no emergent features at this time; however, many disease processes are dynamic:    Please, return to the emergency department if you experience new or worsening symptoms. Return sooner if increased palpitations, difficulty breathing, pain, fever, vomiting, lethargy, weakness, numbness. Fever, chest pain, shortness of breath, difficulty breathing, dizziness, abdominal pain, persistent nausea/vomiting, syncope or passing out, blood in your urine or stool, coughing up blood, leg swelling/pain, urinary retention, bowel or bladder incontinence, numbness between your legs. Additionally, your blood pressure was measured to be high. This is something that you should discuss with your primary care provider and have re-checked within one week.

## 2023-07-21 ENCOUNTER — OFFICE VISIT (OUTPATIENT)
Dept: FAMILY MEDICINE CLINIC | Facility: CLINIC | Age: 38
End: 2023-07-21
Payer: COMMERCIAL

## 2023-07-21 ENCOUNTER — DOCUMENTATION (OUTPATIENT)
Dept: FAMILY MEDICINE CLINIC | Facility: CLINIC | Age: 38
End: 2023-07-21

## 2023-07-21 VITALS
BODY MASS INDEX: 50.02 KG/M2 | TEMPERATURE: 97.6 F | SYSTOLIC BLOOD PRESSURE: 120 MMHG | HEART RATE: 96 BPM | OXYGEN SATURATION: 99 % | HEIGHT: 64 IN | WEIGHT: 293 LBS | DIASTOLIC BLOOD PRESSURE: 82 MMHG

## 2023-07-21 DIAGNOSIS — R10.13 DYSPEPSIA: ICD-10-CM

## 2023-07-21 DIAGNOSIS — I10 PRIMARY HYPERTENSION: Primary | ICD-10-CM

## 2023-07-21 DIAGNOSIS — R07.9 EXERTIONAL CHEST PAIN: ICD-10-CM

## 2023-07-21 DIAGNOSIS — E66.01 MORBID OBESITY WITH BMI OF 50.0-59.9, ADULT (HCC): ICD-10-CM

## 2023-07-21 PROBLEM — R55 PRE-SYNCOPE: Status: ACTIVE | Noted: 2023-07-21

## 2023-07-21 PROBLEM — R73.01 IFG (IMPAIRED FASTING GLUCOSE): Status: ACTIVE | Noted: 2023-07-21

## 2023-07-21 PROCEDURE — 99214 OFFICE O/P EST MOD 30 MIN: CPT | Performed by: FAMILY MEDICINE

## 2023-07-21 RX ORDER — ONDANSETRON 4 MG/1
TABLET, ORALLY DISINTEGRATING ORAL
COMMUNITY
Start: 2023-07-19

## 2023-07-21 RX ORDER — FAMOTIDINE 20 MG/1
20 TABLET, FILM COATED ORAL 2 TIMES DAILY
Qty: 180 TABLET | Refills: 3 | Status: SHIPPED | OUTPATIENT
Start: 2023-07-21 | End: 2024-07-15

## 2023-07-21 NOTE — PROGRESS NOTES
Name: Mario Moritz      : 1985      MRN: [id number]  Encounter Provider: Mal Roca MD  Encounter Date: 2023   Encounter department: [address] [place] [address]     1. Primary hypertension    2. Morbid obesity with BMI of 50.0-59.9, adult ([address] )    3. Exertional chest pain  -     Echo stress test, exercise; Future; Expected date: 2023    4. Dyspepsia  -     famotidine (PEPCID) 20 mg tablet; Take 1 tablet (20 mg total) by mouth 2 (two) times a day      Will obtain echo stress test due to presyncope and exertional chest pain. Continue lisinopril 10 mg daily. We will also trial patient on Pepcid 20 mg daily history of acid reflux. EKG was negative but she still continues to have episodes of tachycardia and chest tightness/pain with activity. This could also be secondary to generalized deconditioning. We will obtain cardiac testing to rule out any underlying pathology. She has a sleep study scheduled for November. Will be starting Wegovy through weight management clinic. .        Subjective      Was standing at work. Nausea and dizziness came on. Blood pressure during this episode at work. Blood pressure was in normal limits and her pulse was 118. Sat down when she began to feel dizzy. Took about 45 minutes for dizziness to resolve upon sitting down. This was similar to her blood pressure episode. No dizziness or instability since episode. Remains on 10 mg of lisinopril. Feels chest pressure in the front of her chest and in her back. Heart rate is elevated during this time per patient. Nontender to touch. No changes with position. The pain is only when she is having the elevated heart rate. Oxygen saturation within normal limits. Describes this as an ache that comes and goes. Experienced it this morning. No pain with breathing. Review of Systems   Constitutional: Negative for fatigue and fever. HENT: Negative for sore throat.     Eyes: Negative for visual disturbance. Respiratory: Positive for chest tightness. Negative for cough and shortness of breath. Cardiovascular: Positive for chest pain (Tightness with exertion) and palpitations. Negative for leg swelling. Gastrointestinal: Negative for abdominal pain, constipation, diarrhea and nausea. Endocrine: Negative for cold intolerance and heat intolerance. Genitourinary: Negative for flank pain. Musculoskeletal: Negative for back pain and neck pain. Skin: Negative for rash. Neurological: Positive for dizziness and light-headedness. Negative for headaches. Psychiatric/Behavioral: Negative for behavioral problems and confusion. Current Outpatient Medications on File Prior to Visit   Medication Sig   • lisinopril (ZESTRIL) 10 mg tablet Take 1 tablet (10 mg total) by mouth daily   • acetaminophen (TYLENOL) 325 mg tablet Take 2 tablets (650 mg total) by mouth every 6 (six) hours as needed for mild pain   • ibuprofen (MOTRIN) 600 mg tablet Take 1 tablet (600 mg total) by mouth every 6 (six) hours as needed for mild pain   • ondansetron (ZOFRAN-ODT) 4 mg disintegrating tablet TAKE 1 TABLET ON TONGUE EVERY SIX TO EIGHT HOURS (Patient not taking: Reported on 7/21/2023)   • [DISCONTINUED] ibuprofen (MOTRIN) 400 mg tablet Take 1 tablet (400 mg total) by mouth every 6 (six) hours as needed for mild pain Do not take on an empty stomach       Objective     /82 (BP Location: Left arm, Patient Position: Sitting, Cuff Size: Large)   Pulse 96   Temp 97.6 °F (36.4 °C)   Ht 5' 4" (1.626 m)   Wt (!) 154 kg (339 lb)   SpO2 99%   BMI 58.19 kg/m²     Physical Exam  Vitals and nursing note reviewed. Constitutional:       Appearance: She is well-developed. She is obese. HENT:      Head: Normocephalic and atraumatic. Cardiovascular:      Rate and Rhythm: Normal rate and regular rhythm. Pulmonary:      Effort: Pulmonary effort is normal.      Breath sounds: Normal breath sounds. Neurological:      General: No focal deficit present. Mental Status: She is alert.    Psychiatric:         Mood and Affect: Mood normal.         Behavior: Behavior normal.       Lisset Carmen MD

## 2023-08-03 DIAGNOSIS — R07.9 EXERTIONAL CHEST PAIN: Primary | ICD-10-CM

## 2023-08-07 ENCOUNTER — HOSPITAL ENCOUNTER (OUTPATIENT)
Dept: NON INVASIVE DIAGNOSTICS | Facility: CLINIC | Age: 38
Discharge: HOME | End: 2023-08-07
Payer: COMMERCIAL

## 2023-08-07 VITALS
HEIGHT: 64 IN | HEART RATE: 91 BPM | OXYGEN SATURATION: 98 % | DIASTOLIC BLOOD PRESSURE: 92 MMHG | BODY MASS INDEX: 50.02 KG/M2 | WEIGHT: 293 LBS | SYSTOLIC BLOOD PRESSURE: 142 MMHG

## 2023-08-07 DIAGNOSIS — R07.9 EXERTIONAL CHEST PAIN: ICD-10-CM

## 2023-08-07 LAB
MAX HR PERCENT: 88 %
MAX HR: 162 BPM
RATE PRESSURE PRODUCT: NORMAL
SL CV STRESS RECOVERY BP: NORMAL MMHG
SL CV STRESS RECOVERY HR: 109 BPM
SL CV STRESS RECOVERY O2 SAT: 97 %
SL CV STRESS STAGE REACHED: 2
STRESS ANGINA INDEX: 0
STRESS BASELINE BP: NORMAL MMHG
STRESS BASELINE HR: 91 BPM
STRESS O2 SAT REST: 98 %
STRESS PEAK HR: 162 BPM
STRESS POST ESTIMATED WORKLOAD: 7 METS
STRESS POST EXERCISE DUR MIN: 6 MIN
STRESS POST O2 SAT PEAK: 96 %
STRESS POST PEAK BP: 192 MMHG

## 2023-08-07 PROCEDURE — 93016 CV STRESS TEST SUPVJ ONLY: CPT | Performed by: INTERNAL MEDICINE

## 2023-08-07 PROCEDURE — 93017 CV STRESS TEST TRACING ONLY: CPT

## 2023-08-07 PROCEDURE — 93018 CV STRESS TEST I&R ONLY: CPT | Performed by: INTERNAL MEDICINE

## 2023-08-07 NOTE — NURSING NOTE
Patient assessed prior to treadmill stress test.  Lungs are cta bilaterally, no loud murmurs appreciated. Patient was c/o severe fatigue and increased work of breathing during test.  Her respiration rate was elevated and during the recovery phase it resolved to baseline at minute 4.

## 2023-08-09 LAB
CHEST PAIN STATEMENT: NORMAL
MAX DIASTOLIC BP: 110 MMHG
MAX HEART RATE: 162 BPM
MAX PREDICTED HEART RATE: 184 BPM
MAX. SYSTOLIC BP: 192 MMHG
PROTOCOL NAME: NORMAL
TARGET HR FORMULA: NORMAL
TEST INDICATION: NORMAL
TIME IN EXERCISE PHASE: NORMAL

## 2023-08-17 ENCOUNTER — RA CDI HCC (OUTPATIENT)
Dept: OTHER | Facility: HOSPITAL | Age: 38
End: 2023-08-17

## 2023-08-17 NOTE — PROGRESS NOTES
[address] [facility] coding opportunities       Chart reviewed, no opportunity found: CHART REVIEWED, NO OPPORTUNITY FOUND        Patients Insurance        Commercial Insurance: Ramiro Aguilar

## 2023-08-25 ENCOUNTER — APPOINTMENT (OUTPATIENT)
Dept: LAB | Facility: MEDICAL CENTER | Age: 38
End: 2023-08-25
Payer: COMMERCIAL

## 2023-08-25 DIAGNOSIS — E66.01 MORBID OBESITY WITH BMI OF 50.0-59.9, ADULT (HCC): ICD-10-CM

## 2023-08-25 DIAGNOSIS — I10 PRIMARY HYPERTENSION: ICD-10-CM

## 2023-08-25 LAB
ANION GAP SERPL CALCULATED.3IONS-SCNC: 10 MMOL/L
BUN SERPL-MCNC: 14 MG/DL (ref 5–25)
CALCIUM SERPL-MCNC: 9.3 MG/DL (ref 8.4–10.2)
CHLORIDE SERPL-SCNC: 104 MMOL/L (ref 96–108)
CHOLEST SERPL-MCNC: 209 MG/DL
CO2 SERPL-SCNC: 25 MMOL/L (ref 21–32)
CREAT SERPL-MCNC: 0.81 MG/DL (ref 0.6–1.3)
GFR SERPL CREATININE-BSD FRML MDRD: 93 ML/MIN/1.73SQ M
GLUCOSE P FAST SERPL-MCNC: 91 MG/DL (ref 65–99)
HDLC SERPL-MCNC: 50 MG/DL
LDLC SERPL CALC-MCNC: 132 MG/DL (ref 0–100)
NONHDLC SERPL-MCNC: 159 MG/DL
POTASSIUM SERPL-SCNC: 4.1 MMOL/L (ref 3.5–5.3)
SODIUM SERPL-SCNC: 139 MMOL/L (ref 135–147)
T4 FREE SERPL-MCNC: 0.61 NG/DL (ref 0.61–1.12)
TRIGL SERPL-MCNC: 136 MG/DL
TSH SERPL DL<=0.05 MIU/L-ACNC: 5.02 UIU/ML (ref 0.45–4.5)

## 2023-08-25 PROCEDURE — 36415 COLL VENOUS BLD VENIPUNCTURE: CPT

## 2023-08-25 PROCEDURE — 80048 BASIC METABOLIC PNL TOTAL CA: CPT

## 2023-08-25 PROCEDURE — 84439 ASSAY OF FREE THYROXINE: CPT

## 2023-08-25 PROCEDURE — 84443 ASSAY THYROID STIM HORMONE: CPT

## 2023-08-25 PROCEDURE — 80061 LIPID PANEL: CPT

## 2023-08-28 ENCOUNTER — OFFICE VISIT (OUTPATIENT)
Dept: FAMILY MEDICINE CLINIC | Facility: CLINIC | Age: 38
End: 2023-08-28
Payer: COMMERCIAL

## 2023-08-28 VITALS
BODY MASS INDEX: 50.02 KG/M2 | DIASTOLIC BLOOD PRESSURE: 78 MMHG | OXYGEN SATURATION: 99 % | TEMPERATURE: 98.2 F | SYSTOLIC BLOOD PRESSURE: 158 MMHG | WEIGHT: 293 LBS | HEIGHT: 64 IN | HEART RATE: 90 BPM

## 2023-08-28 DIAGNOSIS — I10 PRIMARY HYPERTENSION: Primary | ICD-10-CM

## 2023-08-28 DIAGNOSIS — R10.13 DYSPEPSIA: ICD-10-CM

## 2023-08-28 DIAGNOSIS — E03.8 SUBCLINICAL HYPOTHYROIDISM: ICD-10-CM

## 2023-08-28 DIAGNOSIS — E66.01 MORBID OBESITY WITH BMI OF 50.0-59.9, ADULT (HCC): ICD-10-CM

## 2023-08-28 PROCEDURE — 99214 OFFICE O/P EST MOD 30 MIN: CPT | Performed by: FAMILY MEDICINE

## 2023-08-28 RX ORDER — LEVOTHYROXINE SODIUM 0.03 MG/1
25 TABLET ORAL
Qty: 90 TABLET | Refills: 0 | Status: SHIPPED | OUTPATIENT
Start: 2023-08-28

## 2023-08-28 RX ORDER — LISINOPRIL 20 MG/1
20 TABLET ORAL DAILY
Qty: 90 TABLET | Refills: 0 | Status: SHIPPED | OUTPATIENT
Start: 2023-08-28 | End: 2023-11-26

## 2023-08-28 NOTE — ASSESSMENT & PLAN NOTE
Blood pressure elevated 158/78 in office. Increase lisinopril to 20 mg daily. Continue with diet and exercise changes.

## 2023-08-28 NOTE — ASSESSMENT & PLAN NOTE
Follows a weight management doctor and when gap. Plans to start [facility] once available. Has made progress with diet and exercise changes. Continue cutting back on processed foods and sweet drinks.

## 2023-08-28 NOTE — PROGRESS NOTES
Name: Francisca Jha      : 1985      MRN: [id number]  Encounter Provider: Pam Muller MD  Encounter Date: 2023   Encounter department: [address] [place] [address]     1. Primary hypertension  Assessment & Plan:  Blood pressure elevated 158/78 in office. Increase lisinopril to 20 mg daily. Continue with diet and exercise changes. Orders:  -     lisinopril (ZESTRIL) 20 mg tablet; Take 1 tablet (20 mg total) by mouth daily    2. Morbid obesity with BMI of 50.0-59.9, adult [facility])  Assessment & Plan:  Follows a weight management doctor and when gap. Plans to start [facility] ESEQUIEL once available. Has made progress with diet and exercise changes. Continue cutting back on processed foods and sweet drinks. 3. Dyspepsia  Assessment & Plan:  improved with pepcid. Continue pepcid       4. Subclinical hypothyroidism  Assessment & Plan:  TSH elevated. Symptomatic we will start levothyroxine 25 mcg daily. Repeat TSH in 6 weeks. Orders:  -     levothyroxine (Euthyrox) 25 mcg tablet; Take 1 tablet (25 mcg total) by mouth daily in the early morning  -     TSH, 3rd generation with Free T4 reflex; Future      Patient does have a sleep study scheduled for the end of November. Follow back up in 8 weeks to recheck blood pressure and review response to thyroid medication. Subjective      Patient presents with: Follow-up: 8 week follow up Blood pressure & Blood work     BP at home 150-160 at home. Has made progress with diet and exercise. Decreasing gluten intake. Decreasing sweet drinks. Processed sugar. Review of Systems   Constitutional: Positive for fatigue. Negative for fever. HENT: Negative for sore throat. Eyes: Negative for visual disturbance. Respiratory: Negative for cough, chest tightness and shortness of breath. Cardiovascular: Negative for chest pain, palpitations and leg swelling.    Gastrointestinal: Negative for abdominal pain, constipation, diarrhea and nausea. Endocrine: Negative for cold intolerance and heat intolerance. Genitourinary: Negative for flank pain. Musculoskeletal: Negative for back pain and neck pain. Skin: Negative for rash. Neurological: Negative for headaches. Psychiatric/Behavioral: Negative for behavioral problems and confusion. Current Outpatient Medications on File Prior to Visit   Medication Sig   • famotidine (PEPCID) 20 mg tablet Take 1 tablet (20 mg total) by mouth 2 (two) times a day   • [DISCONTINUED] lisinopril (ZESTRIL) 10 mg tablet Take 1 tablet (10 mg total) by mouth daily   • [DISCONTINUED] acetaminophen (TYLENOL) 325 mg tablet Take 2 tablets (650 mg total) by mouth every 6 (six) hours as needed for mild pain   • [DISCONTINUED] ibuprofen (MOTRIN) 600 mg tablet Take 1 tablet (600 mg total) by mouth every 6 (six) hours as needed for mild pain   • [DISCONTINUED] ondansetron (ZOFRAN-ODT) 4 mg disintegrating tablet TAKE 1 TABLET ON TONGUE EVERY SIX TO EIGHT HOURS (Patient not taking: Reported on 7/21/2023)       Objective     /78 (BP Location: Left arm, Patient Position: Sitting, Cuff Size: Thigh)   Pulse 90   Temp 98.2 °F (36.8 °C)   Ht 5' 4" (1.626 m)   Wt (!) 152 kg (335 lb)   SpO2 99%   BMI 57.50 kg/m²     Physical Exam  Vitals and nursing note reviewed. Constitutional:       Appearance: She is well-developed. She is obese. HENT:      Head: Normocephalic and atraumatic. Cardiovascular:      Rate and Rhythm: Normal rate and regular rhythm. Pulmonary:      Effort: Pulmonary effort is normal.      Breath sounds: Normal breath sounds. Neurological:      General: No focal deficit present. Mental Status: She is alert.    Psychiatric:         Mood and Affect: Mood normal.         Behavior: Behavior normal.       Ludmila Mckeon MD

## 2023-10-26 ENCOUNTER — APPOINTMENT (OUTPATIENT)
Dept: LAB | Facility: MEDICAL CENTER | Age: 38
End: 2023-10-26
Payer: COMMERCIAL

## 2023-10-26 DIAGNOSIS — E03.8 SUBCLINICAL HYPOTHYROIDISM: ICD-10-CM

## 2023-10-26 LAB — TSH SERPL DL<=0.05 MIU/L-ACNC: 3.83 UIU/ML (ref 0.45–4.5)

## 2023-10-26 PROCEDURE — 84443 ASSAY THYROID STIM HORMONE: CPT

## 2023-10-26 PROCEDURE — 36415 COLL VENOUS BLD VENIPUNCTURE: CPT

## 2023-10-30 ENCOUNTER — OFFICE VISIT (OUTPATIENT)
Dept: FAMILY MEDICINE CLINIC | Facility: CLINIC | Age: 38
End: 2023-10-30
Payer: COMMERCIAL

## 2023-10-30 VITALS
SYSTOLIC BLOOD PRESSURE: 128 MMHG | DIASTOLIC BLOOD PRESSURE: 82 MMHG | OXYGEN SATURATION: 97 % | TEMPERATURE: 97.7 F | WEIGHT: 293 LBS | HEIGHT: 64 IN | BODY MASS INDEX: 50.02 KG/M2 | RESPIRATION RATE: 17 BRPM | HEART RATE: 102 BPM

## 2023-10-30 DIAGNOSIS — R10.13 DYSPEPSIA: ICD-10-CM

## 2023-10-30 DIAGNOSIS — E03.8 OTHER SPECIFIED HYPOTHYROIDISM: ICD-10-CM

## 2023-10-30 DIAGNOSIS — I10 PRIMARY HYPERTENSION: Primary | ICD-10-CM

## 2023-10-30 DIAGNOSIS — E66.01 MORBID OBESITY WITH BMI OF 50.0-59.9, ADULT (HCC): ICD-10-CM

## 2023-10-30 PROCEDURE — 99214 OFFICE O/P EST MOD 30 MIN: CPT | Performed by: FAMILY MEDICINE

## 2023-10-30 PROCEDURE — 3725F SCREEN DEPRESSION PERFORMED: CPT | Performed by: FAMILY MEDICINE

## 2023-10-30 RX ORDER — OLMESARTAN MEDOXOMIL 20 MG/1
20 TABLET ORAL DAILY
Qty: 30 TABLET | Refills: 5 | Status: SHIPPED | OUTPATIENT
Start: 2023-10-30

## 2023-10-30 NOTE — PROGRESS NOTES
Name: Xiomy Mcdermott      : 1985      MRN: [id number]  Encounter Provider: Mannie Bowman MD  Encounter Date: 10/30/2023   Encounter department: [address] [place] [address]     1. Primary hypertension  Assessment & Plan:  Blood pressure 128/82 in office today. Patient does endorse a slight dry cough from time to time. We will discontinue lisinopril and switch patient to olmesartan 20 mg. Continue monitoring blood pressures at home. Orders:  -     olmesartan (BENICAR) 20 mg tablet; Take 1 tablet (20 mg total) by mouth daily  -     Basic metabolic panel; Future; Expected date: 2024  -     CBC and differential; Future; Expected date: 2024  -     Basic metabolic panel; Future    2. Other specified hypothyroidism  Assessment & Plan:  Currently stable. Takes famotidine prn. Orders:  -     TSH, 3rd generation with Free T4 reflex; Future; Expected date: 2024    3. Morbid obesity with BMI of 50.0-59.9, adult [facility])  Assessment & Plan:  Follows a weight management doctor in [facility]. Plans to start [facility] once available. Has made progress with diet and exercise changes. Cut out soda. Changed portions and stopped eating sweets. Continue cutting back on processed foods and sweet drinks. Orders:  -     Lipid panel; Future; Expected date: 2024    4. Dyspepsia  Assessment & Plan:  Stable on Pepcid 20 mg twice daily. Continue current medications      Sleep Study scheduled for this month. Obtain BMP in 1 month to monitor kidney function with change in blood pressure medication. Remainder of labs can be obtained prior to next annual physical.      Depression Screening and Follow-up Plan: Patient was screened for depression during today's encounter. They screened negative with a PHQ-2 score of 0. Subjective      Patient presents today for follow-up. Blood pressure is currently well controlled. Tolerating medications well.   Had lab work done for TSH which shows an improvement. Continues to work with diet and exercise reports some weight loss. Review of Systems   Constitutional:  Negative for fatigue and fever. HENT:  Negative for sore throat. Eyes:  Negative for visual disturbance. Respiratory:  Negative for cough, chest tightness and shortness of breath. Cardiovascular:  Negative for chest pain, palpitations and leg swelling. Gastrointestinal:  Negative for abdominal pain, constipation, diarrhea and nausea. Endocrine: Negative for cold intolerance and heat intolerance. Genitourinary:  Negative for flank pain. Musculoskeletal:  Negative for back pain and neck pain. Skin:  Negative for rash. Neurological:  Negative for headaches. Psychiatric/Behavioral:  Negative for behavioral problems and confusion. Current Outpatient Medications on File Prior to Visit   Medication Sig   • famotidine (PEPCID) 20 mg tablet Take 1 tablet (20 mg total) by mouth 2 (two) times a day   • levothyroxine (Euthyrox) 25 mcg tablet Take 1 tablet (25 mcg total) by mouth daily in the early morning   • [DISCONTINUED] lisinopril (ZESTRIL) 20 mg tablet Take 1 tablet (20 mg total) by mouth daily       Objective     /82 (BP Location: Left arm, Patient Position: Sitting, Cuff Size: Standard)   Pulse 102   Temp 97.7 °F (36.5 °C) (Tympanic)   Resp 17   Ht 5' 4" (1.626 m)   Wt (!) 152 kg (335 lb)   SpO2 97%   BMI 57.50 kg/m²     Physical Exam  Vitals and nursing note reviewed. Constitutional:       Appearance: She is well-developed. HENT:      Head: Normocephalic and atraumatic. Cardiovascular:      Rate and Rhythm: Normal rate and regular rhythm. Pulmonary:      Effort: Pulmonary effort is normal.      Breath sounds: Normal breath sounds. Neurological:      General: No focal deficit present. Mental Status: She is alert.    Psychiatric:         Mood and Affect: Mood normal.         Behavior: Behavior normal.       Jenae CONLEY Joann Cheng MD

## 2023-10-30 NOTE — ASSESSMENT & PLAN NOTE
Follows a weight management doctor in [facility]. Plans to start [facility] ESEQU[facility]EL once available. Has made progress with diet and exercise changes. Cut out soda. Changed portions and stopped eating sweets. Continue cutting back on processed foods and sweet drinks.

## 2023-10-30 NOTE — ASSESSMENT & PLAN NOTE
Blood pressure 128/82 in office today. Patient does endorse a slight dry cough from time to time. We will discontinue lisinopril and switch patient to olmesartan 20 mg. Continue monitoring blood pressures at home.

## 2023-11-20 ENCOUNTER — OFFICE VISIT (OUTPATIENT)
Dept: SLEEP CENTER | Facility: CLINIC | Age: 38
End: 2023-11-20
Payer: COMMERCIAL

## 2023-11-20 VITALS
HEIGHT: 64 IN | WEIGHT: 293 LBS | OXYGEN SATURATION: 98 % | RESPIRATION RATE: 17 BRPM | HEART RATE: 92 BPM | SYSTOLIC BLOOD PRESSURE: 137 MMHG | DIASTOLIC BLOOD PRESSURE: 75 MMHG | BODY MASS INDEX: 50.02 KG/M2

## 2023-11-20 DIAGNOSIS — E66.01 MORBID OBESITY WITH BMI OF 50.0-59.9, ADULT (HCC): ICD-10-CM

## 2023-11-20 DIAGNOSIS — R06.89 GASPING FOR BREATH: ICD-10-CM

## 2023-11-20 DIAGNOSIS — R06.83 SNORING: ICD-10-CM

## 2023-11-20 PROCEDURE — 99204 OFFICE O/P NEW MOD 45 MIN: CPT | Performed by: PSYCHIATRY & NEUROLOGY

## 2023-11-20 NOTE — PROGRESS NOTES
Assessment/Plan:    1. Morbid obesity with BMI of 50.0-59.9, adult [facility])  -     Ambulatory Referral to Sleep Medicine  -     Ambulatory Referral to Weight Management; Future  -     Home Study; Future    2. Gasping for breath  -     Home Study; Future    3. Snoring  -     Home Study; Future    We discussed that with snoring, reports of gasping in sleep, as well as her elevated BMI, I would recommend a home sleep test to assess for obstructive sleep apnea. Overall, gasping in sleep has improved, this could potentially relate to treatment of GERD with famotidine. She does not describe excessive daytime sleepiness so we discussed that if she has mild obstructive sleep apnea we will need to discuss if treatment is indeed needed. I will follow-up with her after the test to review treatment options. Subjective:      Patient ID: Blaine Forrester is a 41 y.o. female. HPI    Jennet Homans presents as a new patient, due to symptoms of gasping in sleep and hypertension. She has never had a sleep study before. She owns her own business, a [employer] store, she was a professional . [place] Sleepiness Scale  Sitting and reading: Slight chance of dozing  Watching TV: Moderate chance of dozing  Sitting, inactive in a public place (e.g. a theatre or a meeting):  Would never doze  As a passenger in a car for an hour without a break: Slight chance of dozing  Lying down to rest in the afternoon when circumstances permit: Would never doze  Sitting and talking to someone: Would never doze  Sitting quietly after a lunch without alcohol: Would never doze  In a car, while stopped for a few minutes in traffic: Would never doze  Total score: 4      Review of Systems    As above     Objective:      Visit Vitals  /75   Pulse 92   Resp 17   Ht 5' 4" (1.626 m)   Wt (!) 152 kg (334 lb)   SpO2 98%   BMI 57.33 kg/m²   OB Status Unknown   Smoking Status Never   BSA 2.44 m²             Physical Exam        2-3+ tonsils  Mallampati 4 airway  15.5 in neck

## 2023-11-20 NOTE — PROGRESS NOTES
Assessment/Plan:    1. Gasping for breath  -     Home Study; Future    2. Snoring  -     Home Study; Future    3. Morbid obesity with BMI of 50.0-59.9, adult [facility])  -     Ambulatory Referral to Sleep Medicine  -     Ambulatory Referral to Weight Management; Future  -     Home Study; Future          We discussed that with snoring, reports of gasping in sleep, as well as her elevated BMI, I would recommend a home sleep test to assess for obstructive sleep apnea. Overall, gasping in sleep has improved, this could potentially relate to treatment of GERD with famotidine. She does not describe excessive daytime sleepiness so we discussed that if she has mild obstructive sleep apnea we will need to discuss if treatment is indeed needed. I will follow-up with her after the test to review treatment options. Subjective:      Patient ID: Guy Badillo is a 41 y.o. female. Salvatore Hodge presents as a new patient, due to symptoms of gasping in sleep and hypertension. She has never had a sleep study before. 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 Gasping in sleep began a year ago, was waking her up but since has resolved. She is also a chronic snorer. Goes to sleep 11-12   Fall asleep right away  Sometimes has to wake up to let out dogs   Gets about 6 hrs of sleep  When she does wake up has a hard time falling back asleep approx 3 days a week , usually an 1hr    6 am -- wakes up on her own   No naps - only when sick   Sometimes feels refreshed     Coffee 16oz in the AM  Alcohol 1x a month   No smoking     No congestion, no dry mouth in the morning, no bad tastes in the morning        Headaches- in the last year related to BP - occasionally still has them but severity is much less as compared to prior       Grade 4 mallampati score   STOP-BANG- 4   [place] score 4        Review of Systems   HENT:  Negative for congestion and sore throat.     Respiratory:  Negative for cough, chest tightness and shortness of breath. Cardiovascular:  Negative for chest pain, palpitations and leg swelling. Neurological:  Positive for headaches (occasionally, but has been chronic issue). Objective:      /75   Pulse 92   Resp 17   Ht 5' 4" (1.626 m)   Wt (!) 152 kg (334 lb)   SpO2 98%   BMI 57.33 kg/m²          Physical Exam  Constitutional:       Appearance: Normal appearance. HENT:      Head: Normocephalic and atraumatic. Right Ear: External ear normal.      Left Ear: External ear normal.      Nose: Nose normal.      Mouth/Throat:      Mouth: Mucous membranes are moist.   Eyes:      Conjunctiva/sclera: Conjunctivae normal.   Cardiovascular:      Rate and Rhythm: Normal rate and regular rhythm. Pulses: Normal pulses. Heart sounds: Normal heart sounds. Pulmonary:      Effort: Pulmonary effort is normal.      Breath sounds: Normal breath sounds. Musculoskeletal:      Cervical back: Neck supple. Neurological:      Mental Status: She is alert. 2-3+ tonsils  Mallampati 4 airway  15.5 in neck     Data reviewed- labs CO2 level normal at 25 mmol.   Last TSH normal  Also reviewed notes from PCP to understand circumstances of referral

## 2023-11-20 NOTE — PATIENT INSTRUCTIONS
It is very important to avoid driving while drowsy, this can be very dangerous or even cause serious injury or death. If sleepy, it is not safe to get behind the wheel. If you are driving and feels sleepy, it is very important to pull over right away. Even losing control of the car for a split second can be deadly. If you feel you cannot control when sleepiness occurs and cannot prevented, it is important to not drive at all until this improves. Please let me know if you experience this as it is very important. Nursing Support:  When: Monday through Friday 7A-5PM except holidays  Where: Our direct line is [phone]. If you are having a true emergency please call 911. In the event that the line is busy or it is after hours please leave a voice message and we will return your call. Please speak clearly, leaving your full name, birth date, best number to reach you and the reason for your call. Medication refills: We will need the name of the medication, the dosage, the ordering provider, whether you get a 30 or 90 day refill, and the pharmacy name and address. Medications will be ordered by the provider only. Nurses cannot call in prescriptions. Please allow 7 days for medication refills. Physician requested updates: If your provider requested that you call with an update after starting medication, please be ready to provide us the medication and dosage, what time you take your medication, the time you attempt to fall asleep, time you fall asleep, when you wake up, and what time you get out of bed. Sleep Study Results: We will contact you with sleep study results and/or next steps after the physician has reviewed your testing.

## 2023-11-22 DIAGNOSIS — I10 PRIMARY HYPERTENSION: Primary | ICD-10-CM

## 2023-11-22 DIAGNOSIS — E03.8 SUBCLINICAL HYPOTHYROIDISM: ICD-10-CM

## 2023-11-22 RX ORDER — LEVOTHYROXINE SODIUM 0.03 MG/1
25 TABLET ORAL
Qty: 90 TABLET | Refills: 0 | Status: SHIPPED | OUTPATIENT
Start: 2023-11-22

## 2023-11-22 RX ORDER — LOSARTAN POTASSIUM 50 MG/1
50 TABLET ORAL DAILY
Qty: 30 TABLET | Refills: 1 | Status: SHIPPED | OUTPATIENT
Start: 2023-11-22

## 2023-11-28 ENCOUNTER — VBI (OUTPATIENT)
Dept: ADMINISTRATIVE | Facility: OTHER | Age: 38
End: 2023-11-28

## 2023-12-11 DIAGNOSIS — I10 PRIMARY HYPERTENSION: ICD-10-CM

## 2023-12-11 RX ORDER — LOSARTAN POTASSIUM 50 MG/1
50 TABLET ORAL DAILY
Qty: 30 TABLET | Refills: 5 | Status: SHIPPED | OUTPATIENT
Start: 2023-12-11

## 2024-01-02 ENCOUNTER — HOSPITAL ENCOUNTER (OUTPATIENT)
Dept: SLEEP CENTER | Facility: CLINIC | Age: 39
Discharge: HOME | End: 2024-01-02
Payer: COMMERCIAL

## 2024-01-02 DIAGNOSIS — R06.89 GASPING FOR BREATH: ICD-10-CM

## 2024-01-02 DIAGNOSIS — R06.83 SNORING: ICD-10-CM

## 2024-01-02 DIAGNOSIS — E66.01 MORBID OBESITY WITH BMI OF 50.0-59.9, ADULT (HCC): ICD-10-CM

## 2024-01-02 PROCEDURE — G0399 HOME SLEEP TEST/TYPE 3 PORTA: HCPCS

## 2024-01-03 PROBLEM — G47.33 OSA (OBSTRUCTIVE SLEEP APNEA): Status: ACTIVE | Noted: 2024-01-03

## 2024-01-03 PROCEDURE — G0399 HOME SLEEP TEST/TYPE 3 PORTA: HCPCS | Performed by: PSYCHIATRY & NEUROLOGY

## 2024-01-03 NOTE — PROGRESS NOTES
Home Sleep Study Documentation    HOME STUDY DEVICE: Noxturnal no                                           Lily G3 yes      Pre-Sleep Home Study:    Set-up and instructions performed by: KS    Technician performed demonstration for Patient: yes    Return demonstration performed by Patient: yes    Written instructions provided to Patient: yes    Patient signed consent form: yes        Post-Sleep Home Study:    Additional comments by Patient:       Home Sleep Study Failed:no:    Failure reason: N/A    Reported or Detected: N/A    Scored by: ELIEL Cordero

## 2024-01-08 ENCOUNTER — OFFICE VISIT (OUTPATIENT)
Dept: GYNECOLOGY | Facility: CLINIC | Age: 39
End: 2024-01-08
Payer: COMMERCIAL

## 2024-01-08 VITALS
WEIGHT: 293 LBS | BODY MASS INDEX: 50.02 KG/M2 | HEIGHT: 64 IN | DIASTOLIC BLOOD PRESSURE: 84 MMHG | SYSTOLIC BLOOD PRESSURE: 128 MMHG

## 2024-01-08 DIAGNOSIS — Z12.39 ENCOUNTER FOR SCREENING BREAST EXAMINATION: ICD-10-CM

## 2024-01-08 DIAGNOSIS — Z12.4 CERVICAL CANCER SCREENING: ICD-10-CM

## 2024-01-08 DIAGNOSIS — Z11.51 SCREENING FOR HPV (HUMAN PAPILLOMAVIRUS): ICD-10-CM

## 2024-01-08 DIAGNOSIS — Z01.419 ENCOUNTER FOR WELL WOMAN EXAM: Primary | ICD-10-CM

## 2024-01-08 DIAGNOSIS — Z01.419 ROUTINE GYNECOLOGICAL EXAMINATION: ICD-10-CM

## 2024-01-08 PROCEDURE — S0610 ANNUAL GYNECOLOGICAL EXAMINA: HCPCS | Performed by: PHYSICIAN ASSISTANT

## 2024-01-08 PROCEDURE — G0145 SCR C/V CYTO,THINLAYER,RESCR: HCPCS | Performed by: PHYSICIAN ASSISTANT

## 2024-01-08 PROCEDURE — G0476 HPV COMBO ASSAY CA SCREEN: HCPCS | Performed by: PHYSICIAN ASSISTANT

## 2024-01-08 NOTE — PROGRESS NOTES
"Assessment/Plan:    No problem-specific Assessment & Plan notes found for this encounter.       Diagnoses and all orders for this visit:    Encounter for well woman exam    Encounter for screening breast examination    Cervical cancer screening    Screening for HPV (human papillomavirus)  -     Liquid-based pap, screening; Future  -     Liquid-based pap, screening    Routine gynecological examination  -     Liquid-based pap, screening; Future  -     Liquid-based pap, screening          Subjective:      Patient ID: Maida Romero is a 38 y.o. female.    Pt presents as a new patient for her annual exam today--  She has no complaints  She has regular bleeding  no pelvic pain  Bowel and bladder are regular  Colonoscopy--  No breast concerns today  Last mammo--    pap today.    Last pap 2018.  Daily mvi, probiotic          The following portions of the patient's history were reviewed and updated as appropriate: allergies, current medications, past family history, past medical history, past social history, past surgical history, and problem list.    Review of Systems   Constitutional:  Negative for chills, fever and unexpected weight change.   HENT:  Negative for ear pain and sore throat.    Eyes:  Negative for pain and visual disturbance.   Respiratory:  Negative for cough and shortness of breath.    Cardiovascular:  Negative for chest pain and palpitations.   Gastrointestinal:  Negative for abdominal pain, blood in stool, constipation, diarrhea and vomiting.   Genitourinary: Negative.  Negative for dysuria and hematuria.   Musculoskeletal:  Negative for arthralgias and back pain.   Skin:  Negative for color change and rash.   Neurological:  Negative for seizures and syncope.   All other systems reviewed and are negative.        Objective:      /84   Ht 5' 4\" (1.626 m)   Wt (!) 148 kg (325 lb 3.2 oz)   LMP 12/15/2023 (Exact Date)   BMI 55.82 kg/m²          Physical Exam  Vitals and nursing note reviewed. "   Constitutional:       Appearance: Normal appearance. She is well-developed.   HENT:      Head: Normocephalic and atraumatic.   Chest:   Breasts:     Right: No inverted nipple, mass, nipple discharge or skin change.      Left: No inverted nipple, mass, nipple discharge or skin change.   Abdominal:      Palpations: Abdomen is soft.   Genitourinary:     Exam position: Supine.      Labia:         Right: No rash, tenderness or lesion.         Left: No rash, tenderness or lesion.       Vagina: Normal.      Cervix: No cervical motion tenderness, discharge or friability.      Uterus: Normal.       Adnexa:         Right: No mass, tenderness or fullness.          Left: No mass, tenderness or fullness.     Musculoskeletal:      Cervical back: Normal range of motion.   Lymphadenopathy:      Lower Body: No right inguinal adenopathy. No left inguinal adenopathy.   Neurological:      Mental Status: She is alert.

## 2024-01-10 LAB
HPV HR 12 DNA CVX QL NAA+PROBE: NEGATIVE
HPV16 DNA CVX QL NAA+PROBE: NEGATIVE
HPV18 DNA CVX QL NAA+PROBE: NEGATIVE

## 2024-01-15 LAB
LAB AP GYN PRIMARY INTERPRETATION: NORMAL
LAB AP LMP: NORMAL
Lab: NORMAL

## 2024-01-19 DIAGNOSIS — R10.13 DYSPEPSIA: ICD-10-CM

## 2024-01-19 RX ORDER — FAMOTIDINE 20 MG/1
20 TABLET, FILM COATED ORAL 2 TIMES DAILY
Qty: 180 TABLET | Refills: 1 | Status: SHIPPED | OUTPATIENT
Start: 2024-01-19

## 2024-01-22 DIAGNOSIS — I10 PRIMARY HYPERTENSION: ICD-10-CM

## 2024-01-22 RX ORDER — LOSARTAN POTASSIUM 50 MG/1
50 TABLET ORAL DAILY
Qty: 30 TABLET | Refills: 5 | Status: SHIPPED | OUTPATIENT
Start: 2024-01-22

## 2024-01-30 ENCOUNTER — TELEPHONE (OUTPATIENT)
Dept: SLEEP CENTER | Facility: CLINIC | Age: 39
End: 2024-01-30

## 2024-01-30 NOTE — TELEPHONE ENCOUNTER
----- Message from Moose Caldera MD sent at 1/7/2024  9:11 PM EST -----  Test shows JACOB< would recommend CPAP but my plan was to see her in followup to review, so can that be scheduled.  She can also see SILVIA or LA

## 2024-02-05 ENCOUNTER — CONSULT (OUTPATIENT)
Dept: BARIATRICS | Facility: CLINIC | Age: 39
End: 2024-02-05
Payer: COMMERCIAL

## 2024-02-05 VITALS
HEIGHT: 63 IN | WEIGHT: 293 LBS | DIASTOLIC BLOOD PRESSURE: 80 MMHG | BODY MASS INDEX: 51.91 KG/M2 | SYSTOLIC BLOOD PRESSURE: 130 MMHG | HEART RATE: 74 BPM | RESPIRATION RATE: 14 BRPM

## 2024-02-05 DIAGNOSIS — E66.01 MORBID OBESITY WITH BMI OF 50.0-59.9, ADULT (HCC): Primary | ICD-10-CM

## 2024-02-05 DIAGNOSIS — R73.01 IFG (IMPAIRED FASTING GLUCOSE): ICD-10-CM

## 2024-02-05 DIAGNOSIS — E03.8 OTHER SPECIFIED HYPOTHYROIDISM: ICD-10-CM

## 2024-02-05 PROCEDURE — 99204 OFFICE O/P NEW MOD 45 MIN: CPT | Performed by: FAMILY MEDICINE

## 2024-02-05 NOTE — PROGRESS NOTES
Assessment/Plan:  Maida was seen today for consult.    Diagnoses and all orders for this visit:    Morbid obesity with BMI of 50.0-59.9, adult (HCC)  -     Ambulatory Referral to Weight Management  -     Comprehensive metabolic panel; Future  See bellow  Declines Aom's list given   Other specified hypothyroidism  TSH on the high side followed by PCP  IFG (impaired fasting glucose)  -     Hemoglobin A1C; Future  -     Comprehensive metabolic panel; Future         Obesity:   Weight not at goal and patient tried more than 6 months to lose weight and was not able to achieve a meaningful weight loss above 5%  - Discussed options of [facility]-Intensive Lifestyle Intervention Program, Conservative Program, Danielito-En-Y Gastric Bypass, and Vertical Sleeve Gastrectomy and the role of weight loss medications.  - Patient is interested in pursuing [facility]-Intensive Lifestyle Intervention Program  - Initial weight loss goal of 5-10% weight loss for improved health  - Weight loss can improve patient's co-morbid conditions and/or prevent weight-related complications.  Motivational interview performed and patient noted changes to work on until next visit  Handouts provided:  Bariatric surgery information  Calorie goal handouts provided  1800kcal  Hydration: 64oz fluid, no sugary drinks  Goal 3 meals per day  Food log encouraged , phone enma or paper journal  Increase physical activity by 10 minutes daily.   Return in 3 mo    Subjective:   Chief Complaint   Patient presents with    Consult     Initial visit with Medical Bariatrician. Pt diagnosed with JACOB and her BMI 56.69kg/m.       Patient ID: Maida Romero  is a 38 y.o. female with excess weight/obesity here to pursue weight management.  Previous notes and records have been reviewed.        HPI  Wt Readings from Last 20 Encounters:   02/05/24 (!) 145 kg (320 lb)   01/08/24 (!) 148 kg (325 lb 3.2 oz)   11/20/23 (!) 152 kg (334 lb)   10/30/23 (!) 152 kg (335 lb)   08/28/23  "(!) 152 kg (335 lb)   08/07/23 (!) 154 kg (339 lb)   07/21/23 (!) 154 kg (339 lb)   07/13/23 (!) 154 kg (338 lb 13.6 oz)   07/10/23 (!) 154 kg (339 lb 8 oz)   05/04/20 (!) 137 kg (302 lb 6 oz)   01/07/19 (!) 138 kg (304 lb)   12/22/18 (!) 138 kg (304 lb)   05/20/18 (!) 138 kg (303 lb 6.4 oz)   03/07/18 133 kg (293 lb 4 oz)     Obesity/Excess Weight:Body mass index is 56.69 kg/m².  Severity: severe  Onset: childhood  Modifiers: 10 years ago did very low soco diet HCG diet , about 600kcal , lowest weight with it 210lbs   Contributing factors: Stress/Emotional Eating  Associated symptoms: comorbid conditions    B-skips  L-biggest meal of the day protein potato and veggies  D-same as lunch  Snacks:chips but tries to avoid  Hydration:water no soda , coffee in am late  Alcohol: once a mo  Smoking:no  Exercise:kickboxing 3 times a week  Occupation:owns a retail store, has 2 dogs   Sleep:ok       Past Medical History:   Diagnosis Date    Morbid obesity with BMI of 50.0-59.9, adult ([facility]) 5/23/2018    Primary hypertension 7/10/2023     Past Surgical History:   Procedure Laterality Date    CHOLECYSTECTOMY  2006    EYE SURGERY Bilateral 08/2020    Lasik bilateral     The following portions of the patient's history were reviewed and updated as appropriate: allergies, current medications, past family history, past medical history, past social history, past surgical history, and problem list.    ROS:  Review of Systems   Constitutional: Negative for activity change. Fatigue  HENT: Negative for trouble swallowing.    Respiratory: Negative for shortness of breath.    Cardiovascular: Negative for chest pain, edema  Gastrointestinal: Negative for abdominal pain, nausea and vomiting, acid reflux, constipation/diarrhea  Psychiatric/Behavioral: Negative for behavioral problems including anxiety /depression  Objective:  /80 (BP Location: Left arm, Patient Position: Sitting, Cuff Size: Large)   Pulse 74   Resp 14   Ht 5' 3\" (1.6 m) " "  Wt (!) 145 kg (320 lb)   LMP 12/15/2023 (Exact Date)   BMI 56.69 kg/m²   Constitutional: Well-developed, well-nourished and Obese Body mass index is 56.69 kg/m².. Alert, cooperative.  HEENT: No conjunctival injection.    Pulmonary: No increased work of breathing or signs of respiratory distress.Clear respiratory sounds.  CV: Well-perfused, Regular rate and rhythm   Vascular: no peripheral edema  GI: Abdomen obese, Non-distended.    MSK: no sarcopenia noted   Neuro: Oriented to person, place and time. Normal Speech. Normal gait.  Psych: Normal affect and mood. Normal thought process, no delusions     Labs and Imaging  Recent labs and imaging have been personally reviewed.  Lab Results   Component Value Date    WBC 10.04 07/13/2023    HGB 13.9 07/13/2023    HCT 43.5 07/13/2023    MCV 90 07/13/2023     07/13/2023     Lab Results   Component Value Date    SODIUM 139 08/25/2023    K 4.1 08/25/2023     08/25/2023    CO2 25 08/25/2023    AGAP 10 08/25/2023    BUN 14 08/25/2023    CREATININE 0.81 08/25/2023    GLUC 86 07/13/2023    GLUF 91 08/25/2023    CALCIUM 9.3 08/25/2023    AST 21 07/13/2023    ALT 28 07/13/2023    ALKPHOS 53 07/13/2023    TP 8.0 07/13/2023    TBILI 0.42 07/13/2023    EGFR 93 08/25/2023     No results found for: \"HGBA1C\"  Lab Results   Component Value Date    BPG8AADYOQTR 3.833 10/26/2023     Lab Results   Component Value Date    CHOLESTEROL 209 (H) 08/25/2023     Lab Results   Component Value Date    HDL 50 08/25/2023     Lab Results   Component Value Date    TRIG 136 08/25/2023     Lab Results   Component Value Date    LDLCALC 132 (H) 08/25/2023      "

## 2024-02-15 DIAGNOSIS — E03.8 SUBCLINICAL HYPOTHYROIDISM: ICD-10-CM

## 2024-02-15 RX ORDER — LEVOTHYROXINE SODIUM 0.03 MG/1
25 TABLET ORAL
Qty: 90 TABLET | Refills: 1 | Status: SHIPPED | OUTPATIENT
Start: 2024-02-15

## 2024-02-24 DIAGNOSIS — I10 HYPERTENSION, UNSPECIFIED TYPE: Primary | ICD-10-CM

## 2024-02-24 RX ORDER — OLMESARTAN MEDOXOMIL 20 MG/1
20 TABLET ORAL DAILY
Qty: 30 TABLET | Refills: 0 | Status: SHIPPED | OUTPATIENT
Start: 2024-02-24

## 2024-03-18 DIAGNOSIS — I10 HYPERTENSION, UNSPECIFIED TYPE: ICD-10-CM

## 2024-03-19 RX ORDER — OLMESARTAN MEDOXOMIL 20 MG/1
TABLET ORAL
Qty: 30 TABLET | Refills: 3 | Status: SHIPPED | OUTPATIENT
Start: 2024-03-19

## 2024-04-24 DIAGNOSIS — R10.13 DYSPEPSIA: ICD-10-CM

## 2024-04-24 RX ORDER — FAMOTIDINE 20 MG/1
20 TABLET, FILM COATED ORAL 2 TIMES DAILY
Qty: 180 TABLET | Refills: 1 | Status: SHIPPED | OUTPATIENT
Start: 2024-04-24

## 2024-04-29 DIAGNOSIS — E03.8 SUBCLINICAL HYPOTHYROIDISM: ICD-10-CM

## 2024-04-30 ENCOUNTER — TELEPHONE (OUTPATIENT)
Dept: BARIATRICS | Facility: CLINIC | Age: 39
End: 2024-04-30

## 2024-04-30 RX ORDER — LEVOTHYROXINE SODIUM 0.03 MG/1
25 TABLET ORAL
Qty: 90 TABLET | Refills: 1 | Status: SHIPPED | OUTPATIENT
Start: 2024-04-30

## 2024-05-21 DIAGNOSIS — I10 PRIMARY HYPERTENSION: ICD-10-CM

## 2024-05-22 RX ORDER — LOSARTAN POTASSIUM 50 MG/1
50 TABLET ORAL DAILY
Qty: 30 TABLET | Refills: 5 | Status: SHIPPED | OUTPATIENT
Start: 2024-05-22

## 2024-05-23 ENCOUNTER — APPOINTMENT (OUTPATIENT)
Dept: LAB | Facility: MEDICAL CENTER | Age: 39
End: 2024-05-23
Payer: COMMERCIAL

## 2024-05-23 DIAGNOSIS — E66.01 MORBID OBESITY WITH BMI OF 50.0-59.9, ADULT (HCC): ICD-10-CM

## 2024-05-23 DIAGNOSIS — I10 PRIMARY HYPERTENSION: ICD-10-CM

## 2024-05-23 DIAGNOSIS — R73.01 IFG (IMPAIRED FASTING GLUCOSE): ICD-10-CM

## 2024-05-23 DIAGNOSIS — E03.8 OTHER SPECIFIED HYPOTHYROIDISM: ICD-10-CM

## 2024-05-23 LAB
ALBUMIN SERPL BCP-MCNC: 4.2 G/DL (ref 3.5–5)
ALP SERPL-CCNC: 48 U/L (ref 34–104)
ALT SERPL W P-5'-P-CCNC: 15 U/L (ref 7–52)
ANION GAP SERPL CALCULATED.3IONS-SCNC: 10 MMOL/L (ref 4–13)
AST SERPL W P-5'-P-CCNC: 15 U/L (ref 13–39)
BASOPHILS # BLD AUTO: 0.03 THOUSANDS/ÂΜL (ref 0–0.1)
BASOPHILS NFR BLD AUTO: 1 % (ref 0–1)
BILIRUB SERPL-MCNC: 0.42 MG/DL (ref 0.2–1)
BUN SERPL-MCNC: 11 MG/DL (ref 5–25)
CALCIUM SERPL-MCNC: 9.2 MG/DL (ref 8.4–10.2)
CHLORIDE SERPL-SCNC: 105 MMOL/L (ref 96–108)
CHOLEST SERPL-MCNC: 199 MG/DL
CO2 SERPL-SCNC: 24 MMOL/L (ref 21–32)
CREAT SERPL-MCNC: 0.66 MG/DL (ref 0.6–1.3)
EOSINOPHIL # BLD AUTO: 0.1 THOUSAND/ÂΜL (ref 0–0.61)
EOSINOPHIL NFR BLD AUTO: 2 % (ref 0–6)
ERYTHROCYTE [DISTWIDTH] IN BLOOD BY AUTOMATED COUNT: 12.8 % (ref 11.6–15.1)
EST. AVERAGE GLUCOSE BLD GHB EST-MCNC: 105 MG/DL
GFR SERPL CREATININE-BSD FRML MDRD: 113 ML/MIN/1.73SQ M
GLUCOSE P FAST SERPL-MCNC: 87 MG/DL (ref 65–99)
HBA1C MFR BLD: 5.3 %
HCT VFR BLD AUTO: 42.8 % (ref 34.8–46.1)
HDLC SERPL-MCNC: 49 MG/DL
HGB BLD-MCNC: 13.4 G/DL (ref 11.5–15.4)
IMM GRANULOCYTES # BLD AUTO: 0.02 THOUSAND/UL (ref 0–0.2)
IMM GRANULOCYTES NFR BLD AUTO: 0 % (ref 0–2)
LDLC SERPL CALC-MCNC: 125 MG/DL (ref 0–100)
LYMPHOCYTES # BLD AUTO: 1.49 THOUSANDS/ÂΜL (ref 0.6–4.47)
LYMPHOCYTES NFR BLD AUTO: 25 % (ref 14–44)
MCH RBC QN AUTO: 29.8 PG (ref 26.8–34.3)
MCHC RBC AUTO-ENTMCNC: 31.3 G/DL (ref 31.4–37.4)
MCV RBC AUTO: 95 FL (ref 82–98)
MONOCYTES # BLD AUTO: 0.34 THOUSAND/ÂΜL (ref 0.17–1.22)
MONOCYTES NFR BLD AUTO: 6 % (ref 4–12)
NEUTROPHILS # BLD AUTO: 4.11 THOUSANDS/ÂΜL (ref 1.85–7.62)
NEUTS SEG NFR BLD AUTO: 66 % (ref 43–75)
NONHDLC SERPL-MCNC: 150 MG/DL
NRBC BLD AUTO-RTO: 0 /100 WBCS
PLATELET # BLD AUTO: 312 THOUSANDS/UL (ref 149–390)
PMV BLD AUTO: 10.7 FL (ref 8.9–12.7)
POTASSIUM SERPL-SCNC: 3.9 MMOL/L (ref 3.5–5.3)
PROT SERPL-MCNC: 7.2 G/DL (ref 6.4–8.4)
RBC # BLD AUTO: 4.49 MILLION/UL (ref 3.81–5.12)
SODIUM SERPL-SCNC: 139 MMOL/L (ref 135–147)
TRIGL SERPL-MCNC: 126 MG/DL
TSH SERPL DL<=0.05 MIU/L-ACNC: 3 UIU/ML (ref 0.45–4.5)
WBC # BLD AUTO: 6.09 THOUSAND/UL (ref 4.31–10.16)

## 2024-05-23 PROCEDURE — 83036 HEMOGLOBIN GLYCOSYLATED A1C: CPT

## 2024-05-23 PROCEDURE — 36415 COLL VENOUS BLD VENIPUNCTURE: CPT

## 2024-05-23 PROCEDURE — 84443 ASSAY THYROID STIM HORMONE: CPT

## 2024-05-23 PROCEDURE — 85025 COMPLETE CBC W/AUTO DIFF WBC: CPT

## 2024-05-23 PROCEDURE — 80061 LIPID PANEL: CPT

## 2024-05-23 PROCEDURE — 80053 COMPREHEN METABOLIC PANEL: CPT

## 2024-06-20 DIAGNOSIS — I10 HYPERTENSION, UNSPECIFIED TYPE: ICD-10-CM

## 2024-06-20 RX ORDER — OLMESARTAN MEDOXOMIL 20 MG/1
TABLET ORAL
Qty: 30 TABLET | Refills: 5 | Status: SHIPPED | OUTPATIENT
Start: 2024-06-20

## 2024-07-11 ENCOUNTER — OFFICE VISIT (OUTPATIENT)
Dept: FAMILY MEDICINE CLINIC | Facility: CLINIC | Age: 39
End: 2024-07-11
Payer: COMMERCIAL

## 2024-07-11 VITALS
DIASTOLIC BLOOD PRESSURE: 82 MMHG | TEMPERATURE: 98.3 F | WEIGHT: 293 LBS | RESPIRATION RATE: 17 BRPM | BODY MASS INDEX: 50.02 KG/M2 | OXYGEN SATURATION: 100 % | HEART RATE: 82 BPM | SYSTOLIC BLOOD PRESSURE: 124 MMHG | HEIGHT: 64 IN

## 2024-07-11 DIAGNOSIS — E03.8 OTHER SPECIFIED HYPOTHYROIDISM: ICD-10-CM

## 2024-07-11 DIAGNOSIS — G47.33 OSA (OBSTRUCTIVE SLEEP APNEA): ICD-10-CM

## 2024-07-11 DIAGNOSIS — I10 PRIMARY HYPERTENSION: ICD-10-CM

## 2024-07-11 DIAGNOSIS — E66.01 MORBID OBESITY WITH BMI OF 50.0-59.9, ADULT (HCC): ICD-10-CM

## 2024-07-11 DIAGNOSIS — Z00.00 ANNUAL PHYSICAL EXAM: Primary | ICD-10-CM

## 2024-07-11 PROCEDURE — 99395 PREV VISIT EST AGE 18-39: CPT | Performed by: FAMILY MEDICINE

## 2024-07-11 NOTE — PROGRESS NOTES
Adult Annual Physical  Name: Maida Romero      : 1985      MRN: [id number]  Encounter Provider: Jenae Flores MD  Encounter Date: 2024   Encounter department: [facility]    Assessment & Plan   1. Annual physical exam  2. Morbid obesity with BMI of 50.0-59.9, adult (HCC)  3. JACOB (obstructive sleep apnea)  4. Other specified hypothyroidism  -     TSH + Free T4; Future  5. Primary hypertension  -     Basic metabolic panel; Future    She has lost 20 pounds in the past year. Not interested in weight loss surgery or injections.  Continue with intermittent fasting work on increasing exercise 250 minutes of moderate intensity weekly.  Sleep study that showed moderate obstructive sleep apnea.  Has not followed up with sleep medicine.  Unsure if she will be able to tolerate CPAP.  Blood pressure well-controlled.  Continue blood pressure medications and thyroid medication.  Repeat labs in 6 months.  We set a goal weight of 300lb 6 months.      Immunizations and preventive care screenings were discussed with patient today. Appropriate education was printed on patient's after visit summary.    Counseling:  Alcohol/drug use: discussed moderation in alcohol intake, the recommendations for healthy alcohol use, and avoidance of illicit drug use.  Dental Health: discussed importance of regular tooth brushing, flossing, and dental visits.  Injury prevention: discussed safety/seat belts, safety helmets, smoke detectors, carbon dioxide detectors, and smoking near bedding or upholstery.  Sexual health: discussed sexually transmitted diseases, partner selection, use of condoms, avoidance of unintended pregnancy, and contraceptive alternatives.  Exercise: the importance of regular exercise/physical activity was discussed. Recommend exercise 3-5 times per week for at least 30 minutes.          History of Present Illness     Adult Annual Physical:  Patient presents for annual physical.     Diet  and Physical Activity:  - Diet/Nutrition: well balanced diet, intermittent fasting and consuming 3-5 servings of fruits/vegetables daily.  - Exercise: walking.    Depression Screening:  - PHQ-2 Score: 0    General Health:  - Sleep: sleeps well.  - Hearing: normal hearing bilateral ears.  - Vision: no vision problems.  - Dental: regular dental visits.    /GYN Health:  - Follows with GYN: yes.   - History of STDs: no    Review of Systems   Constitutional:  Negative for activity change, fatigue and fever.   Eyes:  Negative for visual disturbance.   Respiratory:  Negative for shortness of breath.    Cardiovascular:  Negative for chest pain.   Gastrointestinal:  Negative for abdominal pain, constipation, diarrhea and nausea.   Endocrine: Negative for cold intolerance and heat intolerance.   Musculoskeletal:  Negative for back pain.   Skin:  Negative for rash.   Neurological:  Negative for headaches.   Psychiatric/Behavioral:  Negative for confusion.      Medical History Reviewed by provider this encounter:  Tobacco  Allergies  Meds  Problems  Med Hx  Surg Hx  Fam Hx       Current Outpatient Medications on File Prior to Visit   Medication Sig Dispense Refill    famotidine (PEPCID) 20 mg tablet TAKE ONE TABLET BY MOUTH TWICE DAILY 180 tablet 1    levothyroxine 25 mcg tablet TAKE 1 TABLET (25 MCG TOTAL) BY MOUTH DAILY IN THE EARLY MORNING 90 tablet 1    losartan (COZAAR) 50 mg tablet TAKE 1 TABLET (50 MG TOTAL) BY MOUTH DAILY 30 tablet 5    [DISCONTINUED] olmesartan (BENICAR) 20 mg tablet TAKE ONE TABLET (20 MG TOTAL) BY MOUTH DAILY 30 tablet 5     No current facility-administered medications on file prior to visit.      Social History     Tobacco Use    Smoking status: Never    Smokeless tobacco: Never   Substance and Sexual Activity    Alcohol use: No     Comment: occasional    Drug use: No    Sexual activity: Not Currently     Partners: Male     Birth control/protection: Condom Male       Objective     /82  "(BP Location: Left arm, Patient Position: Sitting, Cuff Size: Standard)   Pulse 82   Temp 98.3 °F (36.8 °C) (Temporal)   Resp 17   Ht 5' 4\" (1.626 m)   Wt (!) 144 kg (318 lb)   SpO2 100%   BMI 54.58 kg/m²     Physical Exam  Vitals and nursing note reviewed.   Constitutional:       Appearance: Normal appearance. She is well-developed. She is obese.   HENT:      Head: Normocephalic and atraumatic.   Eyes:      Extraocular Movements: Extraocular movements intact.      Pupils: Pupils are equal, round, and reactive to light.   Cardiovascular:      Rate and Rhythm: Normal rate and regular rhythm.   Pulmonary:      Effort: Pulmonary effort is normal.      Breath sounds: Normal breath sounds.   Abdominal:      General: Bowel sounds are normal.      Palpations: Abdomen is soft.   Musculoskeletal:      Cervical back: Normal range of motion.   Skin:     General: Skin is warm and dry.   Neurological:      General: No focal deficit present.      Mental Status: She is alert and oriented to person, place, and time.   Psychiatric:         Mood and Affect: Mood normal.         Speech: Speech normal.         Behavior: Behavior normal.         "

## 2024-07-12 DIAGNOSIS — E03.8 SUBCLINICAL HYPOTHYROIDISM: ICD-10-CM

## 2024-07-12 RX ORDER — LEVOTHYROXINE SODIUM 0.03 MG/1
25 TABLET ORAL
Qty: 90 TABLET | Refills: 1 | Status: SHIPPED | OUTPATIENT
Start: 2024-07-12

## 2024-08-09 DIAGNOSIS — R10.13 DYSPEPSIA: ICD-10-CM

## 2024-08-09 RX ORDER — FAMOTIDINE 20 MG/1
20 TABLET, FILM COATED ORAL 2 TIMES DAILY
Qty: 180 TABLET | Refills: 1 | Status: SHIPPED | OUTPATIENT
Start: 2024-08-09

## 2024-09-27 ENCOUNTER — NURSE TRIAGE (OUTPATIENT)
Age: 39
End: 2024-09-27

## 2024-09-27 DIAGNOSIS — B37.9 YEAST INFECTION: Primary | ICD-10-CM

## 2024-09-27 RX ORDER — FLUCONAZOLE 150 MG/1
150 TABLET ORAL DAILY
Qty: 1 TABLET | Refills: 0 | Status: SHIPPED | OUTPATIENT
Start: 2024-09-27 | End: 2024-09-28

## 2024-09-27 NOTE — TELEPHONE ENCOUNTER
"Patient calling to report Kiswahili symptoms. Notes starting Tuesday with vulvovaginal itching and irritation. Completed Monistat 3 days which mostly helped with vaginal symptoms but vulvar symptoms persist. 1 dose diflucan sent to preferred pharmacy. Reviewed vulvar care.    Advised no underwear or cotton only if necessary, daily gentle cleansing with unscented soap and warm water, change out of wet or sweaty clothing timely, nothing inside vagina/no douching, ice for itching/swelling, Sitz bath (4 tbsp baking soda to bath tub and soak for 10-15 mins, pat dry apply ointment), and aquaphor PRN.    Advised to call back next week if symptoms persist.       Reason for Disposition  • Symptoms of a yeast infection (i.e., itchy, white discharge, not bad smelling) and feels like prior vaginal yeast infections    Answer Assessment - Initial Assessment Questions  1. SYMPTOM: \"What's the main symptom you're concerned about?\" (e.g., pain, itching, dryness)      Vulvovaginal itching/pain  2. LOCATION: \"Where is the  s/s located?\" (e.g., inside/outside, left/right)      Vulvovaginal currently but now vulva  3. ONSET: \"When did the  s/s  start?\"      Tuesday  4. PAIN: \"Is there any pain?\" If Yes, ask: \"How bad is it?\" (Scale: 1-10; mild, moderate, severe)      4/10  5. ITCHING: \"Is there any itching?\" If Yes, ask: \"How bad is it?\" (Scale: 1-10; mild, moderate, severe)      Not currently  6. CAUSE: \"What do you think is causing the discharge?\" \"Have you had the same problem before? What happened then?\"      Possible yeast  7. OTHER SYMPTOMS: \"Do you have any other symptoms?\" (e.g., fever, itching, vaginal bleeding, pain with urination, injury to genital area, vaginal foreign body)      Denies fever and urinary concerns  8. PREGNANCY: \"Is there any chance you are pregnant?\" \"When was your last menstrual period?\"      Denies    Protocols used: Vaginal Symptoms-ADULT-[place]    "

## 2024-10-24 DIAGNOSIS — I10 PRIMARY HYPERTENSION: ICD-10-CM

## 2024-10-24 RX ORDER — LOSARTAN POTASSIUM 50 MG/1
50 TABLET ORAL DAILY
Qty: 30 TABLET | Refills: 5 | Status: SHIPPED | OUTPATIENT
Start: 2024-10-24

## 2024-12-09 DIAGNOSIS — E03.8 SUBCLINICAL HYPOTHYROIDISM: ICD-10-CM

## 2024-12-10 RX ORDER — LEVOTHYROXINE SODIUM 25 UG/1
25 TABLET ORAL
Qty: 90 TABLET | Refills: 1 | Status: SHIPPED | OUTPATIENT
Start: 2024-12-10

## 2024-12-15 ENCOUNTER — HOSPITAL ENCOUNTER (EMERGENCY)
Facility: HOSPITAL | Age: 39
Discharge: HOME | End: 2024-12-15
Attending: EMERGENCY MEDICINE | Admitting: EMERGENCY MEDICINE
Payer: COMMERCIAL

## 2024-12-15 VITALS
OXYGEN SATURATION: 98 % | TEMPERATURE: 98.2 F | DIASTOLIC BLOOD PRESSURE: 100 MMHG | HEART RATE: 94 BPM | RESPIRATION RATE: 18 BRPM | SYSTOLIC BLOOD PRESSURE: 160 MMHG

## 2024-12-15 DIAGNOSIS — S16.1XXA STRAIN OF NECK MUSCLE, INITIAL ENCOUNTER: Primary | ICD-10-CM

## 2024-12-15 PROCEDURE — 99284 EMERGENCY DEPT VISIT MOD MDM: CPT

## 2024-12-15 PROCEDURE — 99283 EMERGENCY DEPT VISIT LOW MDM: CPT

## 2024-12-15 PROCEDURE — 96372 THER/PROPH/DIAG INJ SC/IM: CPT

## 2024-12-15 RX ORDER — LIDOCAINE 50 MG/G
1 PATCH TOPICAL EVERY 24 HOURS
Qty: 15 PATCH | Refills: 0 | Status: SHIPPED | OUTPATIENT
Start: 2024-12-15

## 2024-12-15 RX ORDER — NAPROXEN 500 MG/1
500 TABLET ORAL 2 TIMES DAILY WITH MEALS
Qty: 6 TABLET | Refills: 0 | Status: SHIPPED | OUTPATIENT
Start: 2024-12-15 | End: 2024-12-18

## 2024-12-15 RX ORDER — LIDOCAINE 50 MG/G
1 PATCH TOPICAL ONCE
Status: DISCONTINUED | OUTPATIENT
Start: 2024-12-15 | End: 2024-12-15 | Stop reason: HOSPADM

## 2024-12-15 RX ORDER — METHOCARBAMOL 500 MG/1
500 TABLET, FILM COATED ORAL 2 TIMES DAILY
Qty: 6 TABLET | Refills: 0 | Status: SHIPPED | OUTPATIENT
Start: 2024-12-15 | End: 2024-12-18

## 2024-12-15 RX ORDER — ACETAMINOPHEN 325 MG/1
975 TABLET ORAL ONCE
Status: COMPLETED | OUTPATIENT
Start: 2024-12-15 | End: 2024-12-15

## 2024-12-15 RX ORDER — KETOROLAC TROMETHAMINE 30 MG/ML
15 INJECTION, SOLUTION INTRAMUSCULAR; INTRAVENOUS ONCE
Status: COMPLETED | OUTPATIENT
Start: 2024-12-15 | End: 2024-12-15

## 2024-12-15 RX ORDER — METHOCARBAMOL 500 MG/1
500 TABLET, FILM COATED ORAL ONCE
Status: COMPLETED | OUTPATIENT
Start: 2024-12-15 | End: 2024-12-15

## 2024-12-15 RX ADMIN — ACETAMINOPHEN 975 MG: 325 TABLET, FILM COATED ORAL at 19:22

## 2024-12-15 RX ADMIN — LIDOCAINE 1 PATCH: 50 PATCH TOPICAL at 19:22

## 2024-12-15 RX ADMIN — METHOCARBAMOL 500 MG: 500 TABLET ORAL at 19:22

## 2024-12-15 RX ADMIN — KETOROLAC TROMETHAMINE 15 MG: 30 INJECTION, SOLUTION INTRAMUSCULAR; INTRAVENOUS at 19:22

## 2024-12-16 NOTE — ED PROVIDER NOTES
Time reflects when diagnosis was documented in both MDM as applicable and the Disposition within this note       Time User Action Codes Description Comment    12/15/2024  7:51 PM Lenore Mitchell Add [S16.1XXA] Strain of neck muscle, initial encounter           ED Disposition       ED Disposition   Discharge    Condition   Stable    Date/Time   Sun Dec 15, 2024  7:50 PM    Comment   Maida TARAS Romero discharge to home/self care.                   Assessment & Plan       Medical Decision Making  Patient seen, examined and noted to have strain of neck muscle. Patient coming in after waking up with neck pain and stiffness. Took tylenol at noon. Normal neuro exam. No midline cervical tenderness. No trauma. Pain worse to palpation and with movement. Worse on right side. Improved with medication here in the department. Encouraged PCP follow up. Referral placed to comprehensive spine program. Robaxin sent to pharmacy. Return precautions were discussed.    Differential diagnosis includes but is not limited to strain, sprain, arthritis, spinal stenosis, torticollis, herniated disc, radiculopathy; doubt epidural abscess or fracture or meningitis or carotid dissection.       Patient appears well, is nontoxic appearing, she expresses understanding and agrees with plan of care at this time.  In light of this patient would benefit from outpatient management.    Patient was rx'd: robaxin, lidocaine patches    Patient at time of discharge well-appearing in no acute distress, all questions answered. Patient agreeable to plan.  Patient's vitals, lab/imaging results, diagnosis, and treatment plan were discussed with the patient. All new/changed medications were discussed with patient, specifically, route of administration, how often and when to take, and where they can be picked up. Strict return precautions as well as close follow up with PCP was discussed with the patient and the patient was agreeable to my recommendations. Patient  verbally acknowledged understanding of the above communications. Strict return precautions were provided. All labs reviewed and utilized in the medical decision making process (if labs were ordered).    Risk  OTC drugs.  Prescription drug management.             Medications   methocarbamol (ROBAXIN) tablet 500 mg (500 mg Oral Given 12/15/24 1922)   ketorolac (TORADOL) injection 15 mg (15 mg Intramuscular Given 12/15/24 1922)   acetaminophen (TYLENOL) tablet 975 mg (975 mg Oral Given 12/15/24 1922)       ED Risk Strat Scores                          SBIRT 22yo+      Flowsheet Row Most Recent Value   Initial Alcohol Screen: US AUDIT-C     1. How often do you have a drink containing alcohol? 0 Filed at: 12/15/2024 1851   2. How many drinks containing alcohol do you have on a typical day you are drinking?  0 Filed at: 12/15/2024 1851   3b. FEMALE Any Age, or MALE 65+: How often do you have 4 or more drinks on one occassion? 0 Filed at: 12/15/2024 1851   Audit-C Score 0 Filed at: 12/15/2024 1851   NIRMAL: How many times in the past year have you...    Used an illegal drug or used a prescription medication for non-medical reasons? Never Filed at: 12/15/2024 1851                            History of Present Illness       Chief Complaint   Patient presents with    Neck Pain     Patient woke up unable to move her neck. She took tylenol at 1200       Past Medical History:   Diagnosis Date    Morbid obesity with BMI of 50.0-59.9, adult (HCC) 5/23/2018    Primary hypertension 7/10/2023      Past Surgical History:   Procedure Laterality Date    CHOLECYSTECTOMY  2006    EYE SURGERY Bilateral 08/2020    Lasik bilateral      Family History   Problem Relation Age of Onset    No Known Problems Mother     Cancer Father         Non hodgkins lymphoma      Social History     Tobacco Use    Smoking status: Never    Smokeless tobacco: Never   Substance Use Topics    Alcohol use: No     Comment: occasional    Drug use: No     "  E-Cigarette/Vaping      E-Cigarette/Vaping Substances      I have reviewed and agree with the history as documented.     Maida Romero is a 39 y.o. female presenting to the ED on December 15, 2024 with neck pain. Patient endorses that she woke up this morning with a stiff neck.  She states that the pain is primary right sided. She has had \"kinks\" in her neck before but has been able to work through them with stretching and massaging them. She states that she went to work today which exacerbated her pain. Neck is tender to palpation. Patient has full range of motion of both upper extremities but states that she is having trouble carrying heavy things with her right arm. Patient denies numbness, tingling, weakness, headache, blurry vision, dizziness, midline cervical tenderness or any other complaints at this time.         Neck Pain  Associated symptoms: no chest pain, no fever, no headaches, no numbness and no weakness        Review of Systems   Constitutional:  Negative for chills and fever.   Eyes:  Negative for visual disturbance.   Respiratory:  Negative for shortness of breath.    Cardiovascular:  Negative for chest pain.   Gastrointestinal:  Negative for nausea and vomiting.   Musculoskeletal:  Positive for neck pain and neck stiffness.   Skin:  Negative for color change, pallor, rash and wound.   Neurological:  Negative for dizziness, tremors, weakness, light-headedness, numbness and headaches.           Objective       ED Triage Vitals [12/15/24 1849]   Temperature Pulse Blood Pressure Respirations SpO2 Patient Position - Orthostatic VS   98.2 °F (36.8 °C) 94 160/100 18 98 % Sitting      Temp Source Heart Rate Source BP Location FiO2 (%) Pain Score    Oral Monitor Right arm -- 4      Vitals      Date and Time Temp Pulse SpO2 Resp BP Pain Score FACES Pain Rating User   12/15/24 1849 98.2 °F (36.8 °C) 94 98 % 18 160/100 4 -- AB            Physical Exam  Vitals and nursing note reviewed.   Constitutional:  "      General: She is not in acute distress.     Appearance: Normal appearance. She is not ill-appearing, toxic-appearing or diaphoretic.   HENT:      Head: Normocephalic and atraumatic.      Right Ear: External ear normal.      Left Ear: External ear normal.      Nose: Nose normal. No congestion or rhinorrhea.      Mouth/Throat:      Mouth: Mucous membranes are moist.   Eyes:      General: No scleral icterus.        Right eye: No discharge.         Left eye: No discharge.      Extraocular Movements: Extraocular movements intact.      Conjunctiva/sclera: Conjunctivae normal.   Cardiovascular:      Rate and Rhythm: Normal rate and regular rhythm.      Heart sounds: Normal heart sounds. No murmur heard.     No friction rub. No gallop.   Pulmonary:      Effort: Pulmonary effort is normal. No respiratory distress.      Breath sounds: Normal breath sounds. No wheezing, rhonchi or rales.   Abdominal:      General: Abdomen is flat.   Musculoskeletal:         General: No signs of injury.      Cervical back: Rigidity and tenderness present. No signs of trauma. Pain with movement and muscular tenderness present. No spinous process tenderness. Decreased range of motion.   Lymphadenopathy:      Cervical: No cervical adenopathy.   Skin:     General: Skin is warm.      Capillary Refill: Capillary refill takes less than 2 seconds.      Coloration: Skin is not pale.      Findings: No erythema.   Neurological:      General: No focal deficit present.      Mental Status: She is alert and oriented to person, place, and time. Mental status is at baseline.      Motor: No weakness.      Gait: Gait normal.   Psychiatric:         Mood and Affect: Mood normal.         Behavior: Behavior normal.         Results Reviewed       None            No orders to display       Procedures    ED Medication and Procedure Management   Prior to Admission Medications   Prescriptions Last Dose Informant Patient Reported? Taking?   famotidine (PEPCID) 20 mg  tablet   No No   Sig: TAKE ONE TABLET BY MOUTH TWICE DAILY   levothyroxine 25 mcg tablet   No No   Sig: TAKE 1 TABLET (25 MCG TOTAL) BY MOUTH DAILY IN THE EARLY MORNING   losartan (COZAAR) 50 mg tablet   No No   Sig: TAKE 1 TABLET (50 MG TOTAL) BY MOUTH DAILY      Facility-Administered Medications: None     Discharge Medication List as of 12/15/2024  7:53 PM        START taking these medications    Details   lidocaine (LIDODERM) 5 % Apply 1 patch topically over 12 hours every 24 hours Remove & Discard patch within 12 hours or as directed by MD, Starting Sun 12/15/2024, Normal      methocarbamol (ROBAXIN) 500 mg tablet Take 1 tablet (500 mg total) by mouth 2 (two) times a day for 3 days, Starting Sun 12/15/2024, Until Wed 12/18/2024, Normal      naproxen (Naprosyn) 500 mg tablet Take 1 tablet (500 mg total) by mouth 2 (two) times a day with meals for 3 days, Starting Sun 12/15/2024, Until Wed 12/18/2024, Normal           CONTINUE these medications which have NOT CHANGED    Details   famotidine (PEPCID) 20 mg tablet TAKE ONE TABLET BY MOUTH TWICE DAILY, Starting Fri 8/9/2024, Normal      levothyroxine 25 mcg tablet TAKE 1 TABLET (25 MCG TOTAL) BY MOUTH DAILY IN THE EARLY MORNING, Starting Tue 12/10/2024, Normal      losartan (COZAAR) 50 mg tablet TAKE 1 TABLET (50 MG TOTAL) BY MOUTH DAILY, Starting Thu 10/24/2024, Normal             ED SEPSIS DOCUMENTATION   Time reflects when diagnosis was documented in both MDM as applicable and the Disposition within this note       Time User Action Codes Description Comment    12/15/2024  7:51 PM Lenore Mitchell Add [S16.1XXA] Strain of neck muscle, initial encounter                  Lenore Mitchell PA-C  12/17/24 5202

## 2024-12-16 NOTE — DISCHARGE INSTRUCTIONS
Can take naproxen two times daily for pain. Can take tylenol every 6 hours for pain. Can also take your muscle relaxer two times daily, this medication can make you drowsy so encourage you to take it at night and to avoid taking it before driving. Lidocaine patches can be applied to area of greatest pain. They can be left on for 12 hours at a time. Can also apply heat or ice to back but do not apply heat over lidocaine patch as this will cause a chemical reaction that will burn your skin. Encourage you to remain active. Follow up with your PCP and the comprehensive spine program. Return to the ER if symptoms worsen.

## 2024-12-17 ENCOUNTER — TELEPHONE (OUTPATIENT)
Dept: PHYSICAL THERAPY | Facility: OTHER | Age: 39
End: 2024-12-17

## 2025-01-13 ENCOUNTER — ANNUAL EXAM (OUTPATIENT)
Dept: OBGYN CLINIC | Facility: CLINIC | Age: 40
End: 2025-01-13
Payer: COMMERCIAL

## 2025-01-13 VITALS
WEIGHT: 293 LBS | BODY MASS INDEX: 50.02 KG/M2 | HEIGHT: 64 IN | DIASTOLIC BLOOD PRESSURE: 88 MMHG | SYSTOLIC BLOOD PRESSURE: 130 MMHG

## 2025-01-13 DIAGNOSIS — Z12.39 ENCOUNTER FOR SCREENING BREAST EXAMINATION: ICD-10-CM

## 2025-01-13 DIAGNOSIS — Z01.419 ENCOUNTER FOR WELL WOMAN EXAM: Primary | ICD-10-CM

## 2025-01-13 PROCEDURE — S0612 ANNUAL GYNECOLOGICAL EXAMINA: HCPCS | Performed by: PHYSICIAN ASSISTANT

## 2025-01-13 NOTE — PROGRESS NOTES
Assessment/Plan:      Diagnoses and all orders for this visit:    Encounter for well woman exam    Encounter for screening breast examination          Subjective:     Patient ID: Maida Romero is a 39 y.o. female.    Pt presents for her annual exam today--  She has no complaints  She has mostly regular bleeding  No major pelvic pain  Bowel and bladder are regular  Colonoscopy--  No breast concerns today except some occ tenderness b/l with cycles  Last mammo--    No pap today.    Daily mvi        Review of Systems   Constitutional:  Negative for chills, fever and unexpected weight change.   HENT:  Negative for ear pain and sore throat.    Eyes:  Negative for pain and visual disturbance.   Respiratory:  Negative for cough and shortness of breath.    Cardiovascular:  Negative for chest pain and palpitations.   Gastrointestinal:  Negative for abdominal pain, blood in stool, constipation, diarrhea and vomiting.   Genitourinary: Negative.  Negative for dysuria and hematuria.   Musculoskeletal:  Negative for arthralgias and back pain.   Skin:  Negative for color change and rash.   Neurological:  Negative for seizures and syncope.   All other systems reviewed and are negative.        Objective:     Physical Exam  Vitals and nursing note reviewed.   Constitutional:       Appearance: Normal appearance. She is well-developed.   HENT:      Head: Normocephalic and atraumatic.   Chest:   Breasts:     Right: No inverted nipple, mass, nipple discharge or skin change.      Left: No inverted nipple, mass, nipple discharge or skin change.   Abdominal:      Palpations: Abdomen is soft.   Genitourinary:     Exam position: Supine.      Labia:         Right: No rash, tenderness or lesion.         Left: No rash, tenderness or lesion.       Vagina: Normal.      Cervix: No cervical motion tenderness, discharge or friability.      Adnexa:         Right: No mass, tenderness or fullness.          Left: No mass, tenderness or fullness.      Musculoskeletal:      Cervical back: Normal range of motion.   Lymphadenopathy:      Lower Body: No right inguinal adenopathy. No left inguinal adenopathy.   Neurological:      Mental Status: She is alert.

## 2025-02-03 ENCOUNTER — RA CDI HCC (OUTPATIENT)
Dept: OTHER | Facility: HOSPITAL | Age: 40
End: 2025-02-03

## 2025-02-17 ENCOUNTER — OFFICE VISIT (OUTPATIENT)
Dept: FAMILY MEDICINE CLINIC | Facility: CLINIC | Age: 40
End: 2025-02-17
Payer: COMMERCIAL

## 2025-02-17 VITALS
WEIGHT: 293 LBS | DIASTOLIC BLOOD PRESSURE: 88 MMHG | RESPIRATION RATE: 18 BRPM | SYSTOLIC BLOOD PRESSURE: 124 MMHG | HEART RATE: 85 BPM | BODY MASS INDEX: 50.02 KG/M2 | HEIGHT: 64 IN | OXYGEN SATURATION: 100 % | TEMPERATURE: 97.1 F

## 2025-02-17 DIAGNOSIS — G47.33 OSA (OBSTRUCTIVE SLEEP APNEA): ICD-10-CM

## 2025-02-17 DIAGNOSIS — R73.01 IFG (IMPAIRED FASTING GLUCOSE): ICD-10-CM

## 2025-02-17 DIAGNOSIS — E66.01 CLASS 3 SEVERE OBESITY DUE TO EXCESS CALORIES WITH SERIOUS COMORBIDITY AND BODY MASS INDEX (BMI) OF 50.0 TO 59.9 IN ADULT (HCC): ICD-10-CM

## 2025-02-17 DIAGNOSIS — E66.813 CLASS 3 SEVERE OBESITY DUE TO EXCESS CALORIES WITH SERIOUS COMORBIDITY AND BODY MASS INDEX (BMI) OF 50.0 TO 59.9 IN ADULT (HCC): ICD-10-CM

## 2025-02-17 DIAGNOSIS — I10 PRIMARY HYPERTENSION: Primary | ICD-10-CM

## 2025-02-17 DIAGNOSIS — E03.8 OTHER SPECIFIED HYPOTHYROIDISM: ICD-10-CM

## 2025-02-17 PROCEDURE — 99214 OFFICE O/P EST MOD 30 MIN: CPT | Performed by: FAMILY MEDICINE

## 2025-02-17 RX ORDER — TIRZEPATIDE 2.5 MG/.5ML
2.5 INJECTION, SOLUTION SUBCUTANEOUS WEEKLY
Qty: 2 ML | Refills: 0 | Status: SHIPPED | OUTPATIENT
Start: 2025-02-17 | End: 2025-02-21

## 2025-02-17 NOTE — ASSESSMENT & PLAN NOTE
Start Zepbound for sleep apnea  Orders:    tirzepatide (Zepbound) 2.5 mg/0.5 mL auto-injector; Inject 0.5 mL (2.5 mg total) under the skin once a week for 28 days

## 2025-02-17 NOTE — PROGRESS NOTES
Name: Maida Romero      : 1985      MRN: [id number]  Encounter Provider: Jenae Flores MD  Encounter Date: 2025   Encounter department: [facility]  :  Assessment & Plan  Primary hypertension  BP Readings from Last 3 Encounters:   25 124/88   25 130/88   12/15/24 160/100     Blood pressure remains well-controlled.  Continue losartan 50 mg daily       JACOB (obstructive sleep apnea)  Start Zepbound for sleep apnea  Orders:    tirzepatide (Zepbound) 2.5 mg/0.5 mL auto-injector; Inject 0.5 mL (2.5 mg total) under the skin once a week for 28 days    Class 3 severe obesity due to excess calories with serious comorbidity and body mass index (BMI) of 50.0 to 59.9 in adult (HCC)  Prior Authorization Clinical Questions for Weight Management Pharmacotherapy    1. Does the patient have a contrainidcation to medication prescribed for weight management?: No  2. Does the patient have a diagnosis of obesity, confirmed by a BMI greater than or equal to 30 kg/m^2?: Yes  3. Does the patient have a BMI of greater than or equal to 27 kg/m^2 with at least one weight-related comorbidity/risk factor/complication (e.g. diabetes, dyslipidemia, coronary artery disease)?: Yes  4. Weight-related co-morbidities/risk factors: metabolic syndrome, hypertension, obstructive sleep apnea (JACOB)  5. WEGOVY CVA Indication: Does patient have established documented cardiovascular disease (history of a prior heart attack (myocardial infarction), stroke, or symptomatic peripheral arterial disease (PAD)?: No  6. ZEPBOUND JACOB Indication: Does patient have documented JACOB diagnosed via sleep study (insurance will require copy of sleep study results for approval)?: Yes  7. Has the patient been on a weight loss regimen of low-calorie diet, increased physical activity, and lifestyle modifications for a minimum of 6 months?: Yes  8. Has the patient completed a comprehensive weight loss program (ie, Weight  Watchers, [employer], Bariatrics, other enma on phone)? If so, what?: Yes   -- Q8 Further Explanation: weight watchers   9. Does the patient have a history of type 2 diabetes?: No     Baseline weight (in pounds): 327 lbs       Obesity and sleep apnea.  Would be a great candidate for Zepbound.  Plan to start with 2.5 mg weekly for 4 weeks followed by 5 mg weekly.  Would follow-up in 3 months.  Counseled on appropriate diet and exercise.  Continue weight watchers enma/tracking calories.  Continue mod intensity exercise 150 minutes weekly.    Orders:    tirzepatide (Zepbound) 2.5 mg/0.5 mL auto-injector; Inject 0.5 mL (2.5 mg total) under the skin once a week for 28 days    IFG (impaired fasting glucose)    Orders:    Hemoglobin A1C; Future    Other specified hypothyroidism  Stable.  Continue levothyroxine 25 mcg daily.  Repeat labs         3-month follow-up for weight check      Depression Screening and Follow-up Plan: Patient was screened for depression during today's encounter. They screened negative with a PHQ-2 score of 0.        History of Present Illness   Patient presents with:  Follow-up: 6 m     Overall doing well.  Continues to struggle with weight loss despite making dietary changes and exercise.  She is not interested in bariatric surgery.  She is open to trying weight loss medications.  Her insurance recently changed.  Stable on current medications      Review of Systems   Constitutional:  Positive for unexpected weight change. Negative for activity change, fatigue and fever.   Eyes:  Negative for visual disturbance.   Respiratory:  Negative for shortness of breath.    Cardiovascular:  Negative for chest pain.   Gastrointestinal:  Negative for abdominal pain, constipation, diarrhea and nausea.   Endocrine: Negative for cold intolerance and heat intolerance.   Musculoskeletal:  Negative for back pain.   Skin:  Negative for rash.   Neurological:  Negative for headaches.   Psychiatric/Behavioral:  Negative for  "confusion.        Objective   /88 (BP Location: Left arm, Patient Position: Sitting, Cuff Size: Large)   Pulse 85   Temp (!) 97.1 °F (36.2 °C) (Temporal)   Resp 18   Ht 5' 4\" (1.626 m)   Wt (!) 149 kg (327 lb 8 oz)   SpO2 100%   BMI 56.22 kg/m²      Physical Exam  Vitals and nursing note reviewed.   Constitutional:       Appearance: She is well-developed. She is obese.   HENT:      Head: Normocephalic and atraumatic.   Cardiovascular:      Rate and Rhythm: Normal rate and regular rhythm.   Pulmonary:      Effort: Pulmonary effort is normal.      Breath sounds: Normal breath sounds.   Abdominal:      General: Bowel sounds are normal.      Palpations: Abdomen is soft.   Musculoskeletal:      Cervical back: Normal range of motion.   Skin:     General: Skin is warm.   Neurological:      General: No focal deficit present.      Mental Status: She is alert.   Psychiatric:         Mood and Affect: Mood normal.         Speech: Speech normal.         "

## 2025-02-17 NOTE — ASSESSMENT & PLAN NOTE
BP Readings from Last 3 Encounters:   02/17/25 124/88   01/13/25 130/88   12/15/24 160/100     Blood pressure remains well-controlled.  Continue losartan 50 mg daily

## 2025-02-21 DIAGNOSIS — E66.01 CLASS 3 SEVERE OBESITY DUE TO EXCESS CALORIES WITH SERIOUS COMORBIDITY AND BODY MASS INDEX (BMI) OF 50.0 TO 59.9 IN ADULT (HCC): ICD-10-CM

## 2025-02-21 DIAGNOSIS — G47.33 OSA (OBSTRUCTIVE SLEEP APNEA): ICD-10-CM

## 2025-02-21 DIAGNOSIS — E66.813 CLASS 3 SEVERE OBESITY DUE TO EXCESS CALORIES WITH SERIOUS COMORBIDITY AND BODY MASS INDEX (BMI) OF 50.0 TO 59.9 IN ADULT (HCC): ICD-10-CM

## 2025-02-21 RX ORDER — TIRZEPATIDE 2.5 MG/.5ML
2.5 INJECTION, SOLUTION SUBCUTANEOUS WEEKLY
Qty: 2 ML | Refills: 0 | Status: SHIPPED | OUTPATIENT
Start: 2025-02-21 | End: 2025-03-21

## 2025-02-24 ENCOUNTER — TELEPHONE (OUTPATIENT)
Age: 40
End: 2025-02-24

## 2025-02-24 NOTE — TELEPHONE ENCOUNTER
PA for ZEPBOUND 2.5MG SUBMITTED to [employer]    via    [x]CMM-KEY: [id number]  []Surescripts-Case ID #   []Availity-Auth ID # NDC #   []Faxed to plan   []Other website   []Phone call Case ID #     [x]PA sent as URGENT    All office notes, labs and other pertaining documents and studies sent. Clinical questions answered. Awaiting determination from insurance company.     Turnaround time for your insurance to make a decision on your Prior Authorization can take 7-21 business days.

## 2025-03-25 DIAGNOSIS — G47.33 OSA (OBSTRUCTIVE SLEEP APNEA): ICD-10-CM

## 2025-03-25 DIAGNOSIS — E66.01 CLASS 3 SEVERE OBESITY DUE TO EXCESS CALORIES WITH SERIOUS COMORBIDITY AND BODY MASS INDEX (BMI) OF 50.0 TO 59.9 IN ADULT (HCC): ICD-10-CM

## 2025-03-25 DIAGNOSIS — E66.813 CLASS 3 SEVERE OBESITY DUE TO EXCESS CALORIES WITH SERIOUS COMORBIDITY AND BODY MASS INDEX (BMI) OF 50.0 TO 59.9 IN ADULT (HCC): ICD-10-CM

## 2025-03-26 RX ORDER — TIRZEPATIDE 2.5 MG/.5ML
INJECTION, SOLUTION SUBCUTANEOUS
Qty: 2 ML | Refills: 0 | Status: SHIPPED | OUTPATIENT
Start: 2025-03-26

## 2025-03-28 DIAGNOSIS — I10 PRIMARY HYPERTENSION: ICD-10-CM

## 2025-03-28 RX ORDER — LOSARTAN POTASSIUM 50 MG/1
50 TABLET ORAL DAILY
Qty: 30 TABLET | Refills: 5 | Status: SHIPPED | OUTPATIENT
Start: 2025-03-28

## 2025-04-21 DIAGNOSIS — G47.33 OSA (OBSTRUCTIVE SLEEP APNEA): ICD-10-CM

## 2025-04-21 DIAGNOSIS — E66.01 MORBID OBESITY WITH BMI OF 50.0-59.9, ADULT (HCC): Primary | ICD-10-CM

## 2025-04-21 RX ORDER — TIRZEPATIDE 5 MG/.5ML
5 INJECTION, SOLUTION SUBCUTANEOUS WEEKLY
Qty: 2 ML | Refills: 0 | Status: SHIPPED | OUTPATIENT
Start: 2025-04-21

## 2025-05-05 ENCOUNTER — NURSE TRIAGE (OUTPATIENT)
Age: 40
End: 2025-05-05

## 2025-05-05 DIAGNOSIS — B37.31 YEAST INFECTION INVOLVING THE VAGINA AND SURROUNDING AREA: Primary | ICD-10-CM

## 2025-05-05 RX ORDER — FLUCONAZOLE 150 MG/1
150 TABLET ORAL DAILY
Qty: 1 TABLET | Refills: 0 | Status: SHIPPED | OUTPATIENT
Start: 2025-05-05 | End: 2025-05-06

## 2025-05-05 NOTE — TELEPHONE ENCOUNTER
"FOLLOW UP: Diflucan to pharmacy    REASON FOR CONVERSATION: Vaginitis    SYMPTOMS: Itching, sore, cottage cheese discharge. Denies fever, odor, abnormal bleeding, pregnancy    OTHER: Advised no underwear, especially for sleep, or cotton only if necessary, daily gentle cleansing with unscented soap and warm water, change out of wet or sweaty clothing timely, loose vs. tight fitting clothing, ice for itching/swelling, and aquaphor PRN. Call back if symptoms persist or worsen after treatment      DISPOSITION: Send Prescription Now and Provide Home Care Advice, Home Care      Reason for Disposition   Symptoms of a yeast infection (i.e., itchy, white discharge, not bad smelling) and feels like prior vaginal yeast infections    Answer Assessment - Initial Assessment Questions  1. SYMPTOM: \"What's the main symptom you're concerned about?\" (e.g., pain, itching, dryness)      Itching, sore, cottage cheese discharge  2. LOCATION: \"Where is the  s/s located?\" (e.g., inside/outside, left/right)      Vulvovaginal   3. ONSET: \"When did the  s/s  start?\"      5 days  4. PAIN: \"Is there any pain?\" If Yes, ask: \"How bad is it?\" (Scale: 1-10; mild, moderate, severe)      2/10  5. ITCHING: \"Is there any itching?\" If Yes, ask: \"How bad is it?\" (Scale: 1-10; mild, moderate, severe)      mod  6. CAUSE: \"What do you think is causing the discharge?\" \"Have you had the same problem before?\" \"What happened then?\"      unsure  7. OTHER SYMPTOMS: \"Do you have any other symptoms?\" (e.g., fever, itching, vaginal bleeding, pain with urination, injury to genital area, vaginal foreign body)      Denies fever, odor, abnormal bleeding  8. PREGNANCY: \"Is there any chance you are pregnant?\" \"When was your last menstrual period?\"      Denies -- LMP Friday    Protocols used: Vaginal Symptoms-Adult-[place]    "

## 2025-05-09 ENCOUNTER — RA CDI HCC (OUTPATIENT)
Dept: OTHER | Facility: HOSPITAL | Age: 40
End: 2025-05-09

## 2025-05-15 ENCOUNTER — APPOINTMENT (OUTPATIENT)
Dept: LAB | Facility: MEDICAL CENTER | Age: 40
End: 2025-05-15
Attending: FAMILY MEDICINE
Payer: COMMERCIAL

## 2025-05-15 DIAGNOSIS — R73.01 IFG (IMPAIRED FASTING GLUCOSE): ICD-10-CM

## 2025-05-15 LAB
EST. AVERAGE GLUCOSE BLD GHB EST-MCNC: 108 MG/DL
HBA1C MFR BLD: 5.4 %

## 2025-05-15 PROCEDURE — 83036 HEMOGLOBIN GLYCOSYLATED A1C: CPT

## 2025-05-15 PROCEDURE — 36415 COLL VENOUS BLD VENIPUNCTURE: CPT

## 2025-05-19 ENCOUNTER — OFFICE VISIT (OUTPATIENT)
Dept: FAMILY MEDICINE CLINIC | Facility: CLINIC | Age: 40
End: 2025-05-19
Payer: COMMERCIAL

## 2025-05-19 VITALS
DIASTOLIC BLOOD PRESSURE: 84 MMHG | TEMPERATURE: 97.7 F | RESPIRATION RATE: 18 BRPM | SYSTOLIC BLOOD PRESSURE: 112 MMHG | BODY MASS INDEX: 52.18 KG/M2 | OXYGEN SATURATION: 100 % | WEIGHT: 293 LBS | HEART RATE: 77 BPM

## 2025-05-19 DIAGNOSIS — E66.01 MORBID OBESITY WITH BMI OF 50.0-59.9, ADULT (HCC): Primary | ICD-10-CM

## 2025-05-19 PROCEDURE — 99213 OFFICE O/P EST LOW 20 MIN: CPT | Performed by: FAMILY MEDICINE

## 2025-05-19 RX ORDER — TIRZEPATIDE 5 MG/.5ML
5 INJECTION, SOLUTION SUBCUTANEOUS WEEKLY
Qty: 2 ML | Refills: 0 | Status: SHIPPED | OUTPATIENT
Start: 2025-05-19

## 2025-05-19 RX ORDER — TIRZEPATIDE 5 MG/.5ML
5 INJECTION, SOLUTION SUBCUTANEOUS WEEKLY
Qty: 2 ML | Refills: 0 | Status: SHIPPED | OUTPATIENT
Start: 2025-05-19 | End: 2025-05-19

## 2025-05-19 NOTE — ASSESSMENT & PLAN NOTE
Patient is paying out-of-pocket.  Will send Zepbound vial.  Previously getting medication through [facility].  She is been taking 2.5 mg and plans on increasing to 5 mg.  Pending over $600.  Will try to have patient go through [facility].  Orders:    Tirzepatide-Weight Management (Zepbound) 5 mg/0.5 mL subcutaneous solution (vial); Inject 0.5 mL (5 mg total) under the skin once a week

## 2025-05-19 NOTE — PROGRESS NOTES
Name: Maida Romero      : 1985      MRN: [id number]  Encounter Provider: Jenae Flores MD  Encounter Date: 2025   Encounter department: [facility]  :  Assessment & Plan  Morbid obesity with BMI of 50.0-59.9, adult (HCC)    Patient is paying out-of-pocket.  Will send Zepbound vial.  Previously getting medication through [facility].  She is been taking 2.5 mg and plans on increasing to 5 mg.  Pending over $600.  Will try to have patient go through [facility].  Orders:    Tirzepatide-Weight Management (Zepbound) 5 mg/0.5 mL subcutaneous solution (vial); Inject 0.5 mL (5 mg total) under the skin once a week           History of Present Illness   Patient presents with:  Follow-up    Has been taking a Zepbound through [facility].  Plan on starting 5 mg next week.  She is down 30 pounds.  Continues to watch her diet and improve her exercise.  Tolerating well.  Denies any side effects overall.      Review of Systems   Constitutional:  Negative for activity change, fatigue and fever.   Eyes:  Negative for visual disturbance.   Respiratory:  Negative for shortness of breath.    Cardiovascular:  Negative for chest pain.   Gastrointestinal:  Negative for abdominal pain, constipation, diarrhea and nausea.   Endocrine: Negative for cold intolerance and heat intolerance.   Musculoskeletal:  Negative for back pain.   Skin:  Negative for rash.   Neurological:  Negative for headaches.   Psychiatric/Behavioral:  Negative for confusion.        Objective   /84 (BP Location: Left arm, Patient Position: Sitting, Cuff Size: Large)   Pulse 77   Temp 97.7 °F (36.5 °C) (Temporal)   Resp 18   Wt (!) 138 kg (304 lb)   SpO2 100%   BMI 52.18 kg/m²      Physical Exam  Vitals and nursing note reviewed.   Constitutional:       Appearance: She is well-developed. She is obese.   HENT:      Head: Normocephalic and atraumatic.     Cardiovascular:      Rate and Rhythm: Normal rate and regular rhythm.    Pulmonary:      Effort: Pulmonary effort is normal.      Breath sounds: Normal breath sounds.   Abdominal:      General: Bowel sounds are normal.      Palpations: Abdomen is soft.     Musculoskeletal:      Cervical back: Normal range of motion.     Skin:     General: Skin is warm.     Neurological:      General: No focal deficit present.      Mental Status: She is alert.     Psychiatric:         Mood and Affect: Mood normal.         Speech: Speech normal.

## 2025-06-03 DIAGNOSIS — E66.01 MORBID OBESITY WITH BMI OF 50.0-59.9, ADULT (HCC): ICD-10-CM

## 2025-06-04 RX ORDER — TIRZEPATIDE 5 MG/.5ML
INJECTION, SOLUTION SUBCUTANEOUS
Qty: 2 ML | Refills: 0 | Status: SHIPPED | OUTPATIENT
Start: 2025-06-04

## 2025-06-25 DIAGNOSIS — E66.01 MORBID OBESITY WITH BMI OF 50.0-59.9, ADULT (HCC): ICD-10-CM

## 2025-06-25 RX ORDER — TIRZEPATIDE 5 MG/.5ML
INJECTION, SOLUTION SUBCUTANEOUS
Qty: 2 ML | Refills: 0 | Status: SHIPPED | OUTPATIENT
Start: 2025-06-25

## 2025-07-14 DIAGNOSIS — R10.13 DYSPEPSIA: ICD-10-CM

## 2025-07-16 RX ORDER — FAMOTIDINE 20 MG/1
20 TABLET, FILM COATED ORAL 2 TIMES DAILY
Qty: 180 TABLET | Refills: 1 | Status: SHIPPED | OUTPATIENT
Start: 2025-07-16

## 2025-08-20 ENCOUNTER — OFFICE VISIT (OUTPATIENT)
Dept: FAMILY MEDICINE CLINIC | Facility: CLINIC | Age: 40
End: 2025-08-20
Payer: COMMERCIAL

## 2025-08-20 VITALS
OXYGEN SATURATION: 99 % | DIASTOLIC BLOOD PRESSURE: 74 MMHG | BODY MASS INDEX: 49.08 KG/M2 | HEART RATE: 80 BPM | SYSTOLIC BLOOD PRESSURE: 120 MMHG | HEIGHT: 64 IN | WEIGHT: 287.5 LBS | RESPIRATION RATE: 18 BRPM | TEMPERATURE: 98 F

## 2025-08-20 DIAGNOSIS — J06.9 UPPER RESPIRATORY TRACT INFECTION, UNSPECIFIED TYPE: Primary | ICD-10-CM

## 2025-08-20 PROCEDURE — 99213 OFFICE O/P EST LOW 20 MIN: CPT | Performed by: FAMILY MEDICINE

## 2025-08-20 RX ORDER — PREDNISONE 20 MG/1
40 TABLET ORAL DAILY
Qty: 10 TABLET | Refills: 0 | Status: SHIPPED | OUTPATIENT
Start: 2025-08-20 | End: 2025-08-25

## 2025-08-20 RX ORDER — DOXYCYCLINE HYCLATE 100 MG
100 TABLET ORAL 2 TIMES DAILY
Qty: 14 TABLET | Refills: 0 | Status: SHIPPED | OUTPATIENT
Start: 2025-08-20 | End: 2025-08-27

## 2025-08-20 RX ORDER — ALBUTEROL SULFATE 90 UG/1
2 INHALANT RESPIRATORY (INHALATION) EVERY 6 HOURS PRN
Qty: 18 G | Refills: 5 | Status: SHIPPED | OUTPATIENT
Start: 2025-08-20